# Patient Record
Sex: FEMALE | Race: WHITE | NOT HISPANIC OR LATINO | Employment: FULL TIME | URBAN - METROPOLITAN AREA
[De-identification: names, ages, dates, MRNs, and addresses within clinical notes are randomized per-mention and may not be internally consistent; named-entity substitution may affect disease eponyms.]

---

## 2017-05-18 ENCOUNTER — CONVERSION ENCOUNTER (OUTPATIENT)
Dept: MAMMOGRAPHY | Facility: HOSPITAL | Age: 62
End: 2017-05-18

## 2018-06-18 ENCOUNTER — CONVERSION ENCOUNTER (OUTPATIENT)
Dept: MAMMOGRAPHY | Facility: HOSPITAL | Age: 63
End: 2018-06-18

## 2019-07-29 ENCOUNTER — HOSPITAL ENCOUNTER (OUTPATIENT)
Dept: MAMMOGRAPHY | Facility: HOSPITAL | Age: 64
Discharge: HOME | End: 2019-07-29
Attending: FAMILY MEDICINE

## 2019-08-15 ENCOUNTER — HOSPITAL ENCOUNTER (OUTPATIENT)
Dept: MAMMOGRAPHY | Facility: HOSPITAL | Age: 64
Discharge: HOME | End: 2019-08-15
Attending: FAMILY MEDICINE

## 2020-01-27 ENCOUNTER — HOSPITAL ENCOUNTER (OUTPATIENT)
Dept: CARDIOLOGY | Facility: HOSPITAL | Age: 65
Discharge: HOME | End: 2020-01-27
Attending: FAMILY MEDICINE

## 2020-01-27 ENCOUNTER — HOSPITAL ENCOUNTER (OUTPATIENT)
Dept: OTHER | Facility: HOSPITAL | Age: 65
Discharge: HOME | End: 2020-01-27
Attending: FAMILY MEDICINE

## 2020-02-01 LAB
METANEPH FREE SERPL-SCNC: <10 PG/ML (ref 0–62)
NORMETANEPHRINE, PL: 45 PG/ML (ref 0–145)

## 2020-02-03 LAB
ALDOST SERPL-MCNC: 1.1 NG/DL (ref 0–30)
ALDOST/RENIN PLAS-RTO: 2.7 {RATIO} (ref 0–30)
RENIN PLAS-CCNC: 0.41 NG/ML/HR (ref 0.17–5.38)

## 2020-08-10 ENCOUNTER — HOSPITAL ENCOUNTER (OUTPATIENT)
Dept: MAMMOGRAPHY | Facility: HOSPITAL | Age: 65
Discharge: HOME | End: 2020-08-10
Attending: FAMILY MEDICINE

## 2020-08-27 ENCOUNTER — HOSPITAL ENCOUNTER (OUTPATIENT)
Dept: GENERAL RADIOLOGY | Facility: HOSPITAL | Age: 65
Discharge: HOME | End: 2020-08-27
Attending: INTERNAL MEDICINE

## 2020-08-27 LAB
ALBUMIN SERPL-MCNC: 4.4 G/DL (ref 3.5–5)
ALBUMIN/GLOB SERPL: 1.5 {RATIO} (ref 1.4–2.6)
ALP SERPL-CCNC: 131 U/L (ref 43–160)
ALT SERPL-CCNC: 21 U/L (ref 10–40)
ANION GAP SERPL CALC-SCNC: 18 MMOL/L (ref 8–19)
AST SERPL-CCNC: 20 U/L (ref 15–50)
BILIRUB SERPL-MCNC: 0.28 MG/DL (ref 0.2–1.3)
BUN SERPL-MCNC: 15 MG/DL (ref 5–25)
BUN/CREAT SERPL: 16 {RATIO} (ref 6–20)
CALCIUM SERPL-MCNC: 9.7 MG/DL (ref 8.7–10.4)
CHLORIDE SERPL-SCNC: 103 MMOL/L (ref 99–111)
CHOLEST SERPL-MCNC: 183 MG/DL (ref 107–200)
CHOLEST/HDLC SERPL: 2.5 {RATIO} (ref 3–6)
CONV CO2: 21 MMOL/L (ref 22–32)
CONV TOTAL PROTEIN: 7.4 G/DL (ref 6.3–8.2)
CREAT UR-MCNC: 0.95 MG/DL (ref 0.5–0.9)
GFR SERPLBLD BASED ON 1.73 SQ M-ARVRAT: >60 ML/MIN/{1.73_M2}
GLOBULIN UR ELPH-MCNC: 3 G/DL (ref 2–3.5)
GLUCOSE SERPL-MCNC: 101 MG/DL (ref 65–99)
HDLC SERPL-MCNC: 72 MG/DL (ref 40–60)
LDLC SERPL CALC-MCNC: 91 MG/DL (ref 70–100)
OSMOLALITY SERPL CALC.SUM OF ELEC: 287 MOSM/KG (ref 273–304)
POTASSIUM SERPL-SCNC: 3.9 MMOL/L (ref 3.5–5.3)
SODIUM SERPL-SCNC: 138 MMOL/L (ref 135–147)
TRIGL SERPL-MCNC: 99 MG/DL (ref 40–150)
VLDLC SERPL-MCNC: 20 MG/DL (ref 5–37)

## 2021-03-26 ENCOUNTER — HOSPITAL ENCOUNTER (OUTPATIENT)
Dept: GENERAL RADIOLOGY | Facility: HOSPITAL | Age: 66
Discharge: HOME | End: 2021-03-26
Attending: INTERNAL MEDICINE

## 2021-03-26 LAB
ALBUMIN SERPL-MCNC: 4.3 G/DL (ref 3.5–5)
ALBUMIN/GLOB SERPL: 1.2 {RATIO} (ref 1.4–2.6)
ALP SERPL-CCNC: 115 U/L (ref 43–160)
ALT SERPL-CCNC: 68 U/L (ref 10–40)
ANION GAP SERPL CALC-SCNC: 18 MMOL/L (ref 8–19)
AST SERPL-CCNC: 45 U/L (ref 15–50)
BILIRUB SERPL-MCNC: 0.34 MG/DL (ref 0.2–1.3)
BUN SERPL-MCNC: 17 MG/DL (ref 5–25)
BUN/CREAT SERPL: 18 {RATIO} (ref 6–20)
CALCIUM SERPL-MCNC: 9.5 MG/DL (ref 8.7–10.4)
CHLORIDE SERPL-SCNC: 97 MMOL/L (ref 99–111)
CHOLEST SERPL-MCNC: 157 MG/DL (ref 107–200)
CHOLEST/HDLC SERPL: 2.7 {RATIO} (ref 3–6)
CONV CO2: 24 MMOL/L (ref 22–32)
CONV TOTAL PROTEIN: 7.8 G/DL (ref 6.3–8.2)
CREAT UR-MCNC: 0.97 MG/DL (ref 0.5–0.9)
GFR SERPLBLD BASED ON 1.73 SQ M-ARVRAT: >60 ML/MIN/{1.73_M2}
GLOBULIN UR ELPH-MCNC: 3.5 G/DL (ref 2–3.5)
GLUCOSE SERPL-MCNC: 106 MG/DL (ref 65–99)
HDLC SERPL-MCNC: 59 MG/DL (ref 40–60)
LDLC SERPL CALC-MCNC: 70 MG/DL (ref 70–100)
OSMOLALITY SERPL CALC.SUM OF ELEC: 280 MOSM/KG (ref 273–304)
POTASSIUM SERPL-SCNC: 4.6 MMOL/L (ref 3.5–5.3)
SODIUM SERPL-SCNC: 134 MMOL/L (ref 135–147)
TRIGL SERPL-MCNC: 138 MG/DL (ref 40–150)
VLDLC SERPL-MCNC: 28 MG/DL (ref 5–37)

## 2021-05-22 ENCOUNTER — TRANSCRIBE ORDERS (OUTPATIENT)
Dept: ADMINISTRATIVE | Facility: HOSPITAL | Age: 66
End: 2021-05-22

## 2021-05-22 DIAGNOSIS — Z12.31 SCREENING MAMMOGRAM FOR HIGH-RISK PATIENT: Primary | ICD-10-CM

## 2021-08-11 ENCOUNTER — HOSPITAL ENCOUNTER (OUTPATIENT)
Dept: MAMMOGRAPHY | Facility: HOSPITAL | Age: 66
Discharge: HOME | End: 2021-08-11
Admitting: FAMILY MEDICINE

## 2021-08-11 PROCEDURE — 77067 SCR MAMMO BI INCL CAD: CPT | Performed by: RADIOLOGY

## 2021-08-11 PROCEDURE — 77067 SCR MAMMO BI INCL CAD: CPT

## 2021-08-11 PROCEDURE — 77063 BREAST TOMOSYNTHESIS BI: CPT

## 2021-08-11 PROCEDURE — 77063 BREAST TOMOSYNTHESIS BI: CPT | Performed by: RADIOLOGY

## 2021-08-13 ENCOUNTER — LAB (OUTPATIENT)
Dept: LAB | Facility: HOSPITAL | Age: 66
End: 2021-08-13

## 2021-08-13 ENCOUNTER — TRANSCRIBE ORDERS (OUTPATIENT)
Dept: LAB | Facility: HOSPITAL | Age: 66
End: 2021-08-13

## 2021-08-13 DIAGNOSIS — R07.9 CHEST PAIN, UNSPECIFIED TYPE: Primary | ICD-10-CM

## 2021-08-13 DIAGNOSIS — R07.9 CHEST PAIN, UNSPECIFIED TYPE: ICD-10-CM

## 2021-08-13 LAB
ALBUMIN SERPL-MCNC: 4.5 G/DL (ref 3.5–5.2)
ALBUMIN/GLOB SERPL: 1.8 G/DL
ALP SERPL-CCNC: 106 U/L (ref 39–117)
ALT SERPL W P-5'-P-CCNC: 20 U/L (ref 1–33)
ANION GAP SERPL CALCULATED.3IONS-SCNC: 13 MMOL/L (ref 5–15)
AST SERPL-CCNC: 17 U/L (ref 1–32)
BILIRUB SERPL-MCNC: 0.3 MG/DL (ref 0–1.2)
BUN SERPL-MCNC: 10 MG/DL (ref 8–23)
BUN/CREAT SERPL: 10.4 (ref 7–25)
CALCIUM SPEC-SCNC: 9.4 MG/DL (ref 8.6–10.5)
CHLORIDE SERPL-SCNC: 99 MMOL/L (ref 98–107)
CHOLEST SERPL-MCNC: 152 MG/DL (ref 0–200)
CO2 SERPL-SCNC: 25 MMOL/L (ref 22–29)
CREAT SERPL-MCNC: 0.96 MG/DL (ref 0.57–1)
GFR SERPL CREATININE-BSD FRML MDRD: 58 ML/MIN/1.73
GLOBULIN UR ELPH-MCNC: 2.5 GM/DL
GLUCOSE SERPL-MCNC: 102 MG/DL (ref 65–99)
HDLC SERPL-MCNC: 68 MG/DL (ref 40–60)
LDLC SERPL CALC-MCNC: 65 MG/DL (ref 0–100)
LDLC/HDLC SERPL: 0.92 {RATIO}
POTASSIUM SERPL-SCNC: 4 MMOL/L (ref 3.5–5.2)
PROT SERPL-MCNC: 7 G/DL (ref 6–8.5)
SODIUM SERPL-SCNC: 137 MMOL/L (ref 136–145)
TRIGL SERPL-MCNC: 107 MG/DL (ref 0–150)
VLDLC SERPL-MCNC: 19 MG/DL (ref 5–40)

## 2021-08-13 PROCEDURE — 80053 COMPREHEN METABOLIC PANEL: CPT

## 2021-08-13 PROCEDURE — 80061 LIPID PANEL: CPT

## 2021-08-13 PROCEDURE — 36415 COLL VENOUS BLD VENIPUNCTURE: CPT

## 2021-08-17 ENCOUNTER — TRANSCRIBE ORDERS (OUTPATIENT)
Dept: ADMINISTRATIVE | Facility: HOSPITAL | Age: 66
End: 2021-08-17

## 2021-08-17 DIAGNOSIS — R92.8 ABNORMAL MAMMOGRAM: Primary | ICD-10-CM

## 2021-09-13 ENCOUNTER — APPOINTMENT (OUTPATIENT)
Dept: ULTRASOUND IMAGING | Facility: HOSPITAL | Age: 66
End: 2021-09-13

## 2021-09-13 ENCOUNTER — APPOINTMENT (OUTPATIENT)
Dept: MAMMOGRAPHY | Facility: HOSPITAL | Age: 66
End: 2021-09-13

## 2021-10-04 ENCOUNTER — HOSPITAL ENCOUNTER (OUTPATIENT)
Dept: ULTRASOUND IMAGING | Facility: HOSPITAL | Age: 66
Discharge: HOME | End: 2021-10-04

## 2021-10-04 ENCOUNTER — HOSPITAL ENCOUNTER (OUTPATIENT)
Dept: MAMMOGRAPHY | Facility: HOSPITAL | Age: 66
Discharge: HOME | End: 2021-10-04

## 2021-10-04 DIAGNOSIS — R92.8 ABNORMAL MAMMOGRAM: ICD-10-CM

## 2021-10-04 PROCEDURE — 77065 DX MAMMO INCL CAD UNI: CPT

## 2021-10-04 PROCEDURE — G0279 TOMOSYNTHESIS, MAMMO: HCPCS

## 2022-02-24 ENCOUNTER — TRANSCRIBE ORDERS (OUTPATIENT)
Dept: LAB | Facility: HOSPITAL | Age: 67
End: 2022-02-24

## 2022-02-24 ENCOUNTER — LAB (OUTPATIENT)
Dept: LAB | Facility: HOSPITAL | Age: 67
End: 2022-02-24

## 2022-02-24 DIAGNOSIS — R53.83 TIREDNESS: ICD-10-CM

## 2022-02-24 DIAGNOSIS — R07.9 CHEST PAIN, UNSPECIFIED TYPE: ICD-10-CM

## 2022-02-24 LAB
ALBUMIN SERPL-MCNC: 4.9 G/DL (ref 3.5–5.2)
ALBUMIN/GLOB SERPL: 1.7 G/DL
ALP SERPL-CCNC: 124 U/L (ref 39–117)
ALT SERPL W P-5'-P-CCNC: 27 U/L (ref 1–33)
ANION GAP SERPL CALCULATED.3IONS-SCNC: 13.1 MMOL/L (ref 5–15)
AST SERPL-CCNC: 25 U/L (ref 1–32)
BILIRUB SERPL-MCNC: 0.4 MG/DL (ref 0–1.2)
BUN SERPL-MCNC: 11 MG/DL (ref 8–23)
BUN/CREAT SERPL: 14.5 (ref 7–25)
CALCIUM SPEC-SCNC: 9.3 MG/DL (ref 8.6–10.5)
CHLORIDE SERPL-SCNC: 96 MMOL/L (ref 98–107)
CHOLEST SERPL-MCNC: 158 MG/DL (ref 0–200)
CO2 SERPL-SCNC: 26.9 MMOL/L (ref 22–29)
CREAT SERPL-MCNC: 0.76 MG/DL (ref 0.57–1)
GFR SERPL CREATININE-BSD FRML MDRD: 76 ML/MIN/1.73
GLOBULIN UR ELPH-MCNC: 2.9 GM/DL
GLUCOSE SERPL-MCNC: 108 MG/DL (ref 65–99)
HDLC SERPL-MCNC: 57 MG/DL (ref 40–60)
LDLC SERPL CALC-MCNC: 79 MG/DL (ref 0–100)
LDLC/HDLC SERPL: 1.34 {RATIO}
POTASSIUM SERPL-SCNC: 3.5 MMOL/L (ref 3.5–5.2)
PROT SERPL-MCNC: 7.8 G/DL (ref 6–8.5)
SODIUM SERPL-SCNC: 136 MMOL/L (ref 136–145)
T4 FREE SERPL-MCNC: 1.08 NG/DL (ref 0.93–1.7)
TRIGL SERPL-MCNC: 122 MG/DL (ref 0–150)
TSH SERPL DL<=0.05 MIU/L-ACNC: 2.27 UIU/ML (ref 0.27–4.2)
VLDLC SERPL-MCNC: 22 MG/DL (ref 5–40)

## 2022-02-24 PROCEDURE — 84443 ASSAY THYROID STIM HORMONE: CPT

## 2022-02-24 PROCEDURE — 80053 COMPREHEN METABOLIC PANEL: CPT

## 2022-02-24 PROCEDURE — 36415 COLL VENOUS BLD VENIPUNCTURE: CPT

## 2022-02-24 PROCEDURE — 84439 ASSAY OF FREE THYROXINE: CPT

## 2022-02-24 PROCEDURE — 80061 LIPID PANEL: CPT

## 2022-04-01 ENCOUNTER — TRANSCRIBE ORDERS (OUTPATIENT)
Dept: ADMINISTRATIVE | Facility: HOSPITAL | Age: 67
End: 2022-04-01

## 2022-04-01 DIAGNOSIS — Z12.31 SCREENING MAMMOGRAM, ENCOUNTER FOR: Primary | ICD-10-CM

## 2022-04-01 DIAGNOSIS — M85.80 OSTEOPENIA AFTER MENOPAUSE: ICD-10-CM

## 2022-04-01 DIAGNOSIS — Z78.0 OSTEOPENIA AFTER MENOPAUSE: ICD-10-CM

## 2022-04-01 DIAGNOSIS — M81.0 SENILE OSTEOPOROSIS: ICD-10-CM

## 2022-08-15 ENCOUNTER — HOSPITAL ENCOUNTER (OUTPATIENT)
Dept: MAMMOGRAPHY | Facility: HOSPITAL | Age: 67
Discharge: HOME | End: 2022-08-15

## 2022-08-15 ENCOUNTER — HOSPITAL ENCOUNTER (OUTPATIENT)
Dept: BONE DENSITY | Facility: HOSPITAL | Age: 67
Discharge: HOME | End: 2022-08-15

## 2022-08-15 DIAGNOSIS — M85.80 OSTEOPENIA AFTER MENOPAUSE: ICD-10-CM

## 2022-08-15 DIAGNOSIS — Z12.31 SCREENING MAMMOGRAM, ENCOUNTER FOR: ICD-10-CM

## 2022-08-15 DIAGNOSIS — Z78.0 OSTEOPENIA AFTER MENOPAUSE: ICD-10-CM

## 2022-08-15 DIAGNOSIS — M81.0 SENILE OSTEOPOROSIS: ICD-10-CM

## 2022-08-15 PROCEDURE — 77080 DXA BONE DENSITY AXIAL: CPT

## 2022-08-15 PROCEDURE — 77063 BREAST TOMOSYNTHESIS BI: CPT

## 2022-08-15 PROCEDURE — 77067 SCR MAMMO BI INCL CAD: CPT

## 2022-10-11 ENCOUNTER — LAB (OUTPATIENT)
Dept: LAB | Facility: HOSPITAL | Age: 67
End: 2022-10-11

## 2022-10-11 ENCOUNTER — TRANSCRIBE ORDERS (OUTPATIENT)
Dept: ADMINISTRATIVE | Facility: HOSPITAL | Age: 67
End: 2022-10-11

## 2022-10-11 DIAGNOSIS — R07.9 CHEST PAIN, UNSPECIFIED TYPE: ICD-10-CM

## 2022-10-11 DIAGNOSIS — R07.9 CHEST PAIN, UNSPECIFIED TYPE: Primary | ICD-10-CM

## 2022-10-11 LAB
ALBUMIN SERPL-MCNC: 4.7 G/DL (ref 3.5–5.2)
ALBUMIN/GLOB SERPL: 1.7 G/DL
ALP SERPL-CCNC: 117 U/L (ref 39–117)
ALT SERPL W P-5'-P-CCNC: 18 U/L (ref 1–33)
ANION GAP SERPL CALCULATED.3IONS-SCNC: 11 MMOL/L (ref 5–15)
AST SERPL-CCNC: 17 U/L (ref 1–32)
BILIRUB SERPL-MCNC: 0.3 MG/DL (ref 0–1.2)
BUN SERPL-MCNC: 12 MG/DL (ref 8–23)
BUN/CREAT SERPL: 13.5 (ref 7–25)
CALCIUM SPEC-SCNC: 9.7 MG/DL (ref 8.6–10.5)
CHLORIDE SERPL-SCNC: 96 MMOL/L (ref 98–107)
CHOLEST SERPL-MCNC: 170 MG/DL (ref 0–200)
CO2 SERPL-SCNC: 29 MMOL/L (ref 22–29)
CREAT SERPL-MCNC: 0.89 MG/DL (ref 0.57–1)
EGFRCR SERPLBLD CKD-EPI 2021: 71.6 ML/MIN/1.73
GLOBULIN UR ELPH-MCNC: 2.8 GM/DL
GLUCOSE SERPL-MCNC: 110 MG/DL (ref 65–99)
HDLC SERPL-MCNC: 60 MG/DL (ref 40–60)
LDLC SERPL CALC-MCNC: 89 MG/DL (ref 0–100)
LDLC/HDLC SERPL: 1.43 {RATIO}
POTASSIUM SERPL-SCNC: 4.5 MMOL/L (ref 3.5–5.2)
PROT SERPL-MCNC: 7.5 G/DL (ref 6–8.5)
SODIUM SERPL-SCNC: 136 MMOL/L (ref 136–145)
TRIGL SERPL-MCNC: 121 MG/DL (ref 0–150)
VLDLC SERPL-MCNC: 21 MG/DL (ref 5–40)

## 2022-10-11 PROCEDURE — 36415 COLL VENOUS BLD VENIPUNCTURE: CPT

## 2022-10-11 PROCEDURE — 80053 COMPREHEN METABOLIC PANEL: CPT

## 2022-10-11 PROCEDURE — 80061 LIPID PANEL: CPT

## 2022-10-14 ENCOUNTER — TRANSCRIBE ORDERS (OUTPATIENT)
Dept: ADMINISTRATIVE | Facility: HOSPITAL | Age: 67
End: 2022-10-14

## 2022-10-14 DIAGNOSIS — Z12.31 SCREENING MAMMOGRAM FOR BREAST CANCER: Primary | ICD-10-CM

## 2022-11-10 ENCOUNTER — HOSPITAL ENCOUNTER (EMERGENCY)
Facility: HOSPITAL | Age: 67
Discharge: HOME | End: 2022-11-10
Attending: EMERGENCY MEDICINE | Admitting: EMERGENCY MEDICINE

## 2022-11-10 ENCOUNTER — APPOINTMENT (OUTPATIENT)
Dept: GENERAL RADIOLOGY | Facility: HOSPITAL | Age: 67
End: 2022-11-10

## 2022-11-10 VITALS
OXYGEN SATURATION: 94 % | BODY MASS INDEX: 37 KG/M2 | DIASTOLIC BLOOD PRESSURE: 65 MMHG | HEART RATE: 64 BPM | RESPIRATION RATE: 18 BRPM | SYSTOLIC BLOOD PRESSURE: 101 MMHG | TEMPERATURE: 98 F | HEIGHT: 64 IN | WEIGHT: 216.71 LBS

## 2022-11-10 DIAGNOSIS — M79.602 PAIN OF LEFT UPPER EXTREMITY: Primary | ICD-10-CM

## 2022-11-10 LAB
ALBUMIN SERPL-MCNC: 4.8 G/DL (ref 3.5–5.2)
ALBUMIN/GLOB SERPL: 1.5 G/DL
ALP SERPL-CCNC: 119 U/L (ref 39–117)
ALT SERPL W P-5'-P-CCNC: 20 U/L (ref 1–33)
ANION GAP SERPL CALCULATED.3IONS-SCNC: 13.7 MMOL/L (ref 5–15)
AST SERPL-CCNC: 16 U/L (ref 1–32)
BASOPHILS # BLD AUTO: 0.02 10*3/MM3 (ref 0–0.2)
BASOPHILS NFR BLD AUTO: 0.3 % (ref 0–1.5)
BILIRUB SERPL-MCNC: 0.4 MG/DL (ref 0–1.2)
BUN SERPL-MCNC: 18 MG/DL (ref 8–23)
BUN/CREAT SERPL: 17 (ref 7–25)
CALCIUM SPEC-SCNC: 9.3 MG/DL (ref 8.6–10.5)
CHLORIDE SERPL-SCNC: 101 MMOL/L (ref 98–107)
CO2 SERPL-SCNC: 23.3 MMOL/L (ref 22–29)
CREAT SERPL-MCNC: 1.06 MG/DL (ref 0.57–1)
DEPRECATED RDW RBC AUTO: 43.3 FL (ref 37–54)
EGFRCR SERPLBLD CKD-EPI 2021: 58.1 ML/MIN/1.73
EOSINOPHIL # BLD AUTO: 0.13 10*3/MM3 (ref 0–0.4)
EOSINOPHIL NFR BLD AUTO: 1.7 % (ref 0.3–6.2)
ERYTHROCYTE [DISTWIDTH] IN BLOOD BY AUTOMATED COUNT: 12.6 % (ref 12.3–15.4)
GLOBULIN UR ELPH-MCNC: 3.2 GM/DL
GLUCOSE SERPL-MCNC: 115 MG/DL (ref 65–99)
HCT VFR BLD AUTO: 41.2 % (ref 34–46.6)
HGB BLD-MCNC: 14.3 G/DL (ref 12–15.9)
HOLD SPECIMEN: NORMAL
IMM GRANULOCYTES # BLD AUTO: 0.03 10*3/MM3 (ref 0–0.05)
IMM GRANULOCYTES NFR BLD AUTO: 0.4 % (ref 0–0.5)
LIPASE SERPL-CCNC: 37 U/L (ref 13–60)
LYMPHOCYTES # BLD AUTO: 1.52 10*3/MM3 (ref 0.7–3.1)
LYMPHOCYTES NFR BLD AUTO: 20.1 % (ref 19.6–45.3)
MAGNESIUM SERPL-MCNC: 2.1 MG/DL (ref 1.6–2.4)
MCH RBC QN AUTO: 32.4 PG (ref 26.6–33)
MCHC RBC AUTO-ENTMCNC: 34.7 G/DL (ref 31.5–35.7)
MCV RBC AUTO: 93.2 FL (ref 79–97)
MONOCYTES # BLD AUTO: 0.48 10*3/MM3 (ref 0.1–0.9)
MONOCYTES NFR BLD AUTO: 6.3 % (ref 5–12)
NEUTROPHILS NFR BLD AUTO: 5.38 10*3/MM3 (ref 1.7–7)
NEUTROPHILS NFR BLD AUTO: 71.2 % (ref 42.7–76)
NRBC BLD AUTO-RTO: 0 /100 WBC (ref 0–0.2)
NT-PROBNP SERPL-MCNC: <36 PG/ML (ref 0–900)
PLATELET # BLD AUTO: 365 10*3/MM3 (ref 140–450)
PMV BLD AUTO: 9.4 FL (ref 6–12)
POTASSIUM SERPL-SCNC: 3.5 MMOL/L (ref 3.5–5.2)
PROT SERPL-MCNC: 8 G/DL (ref 6–8.5)
QT INTERVAL: 426 MS
RBC # BLD AUTO: 4.42 10*6/MM3 (ref 3.77–5.28)
SODIUM SERPL-SCNC: 138 MMOL/L (ref 136–145)
TROPONIN I SERPL-MCNC: 0 NG/ML (ref 0–0.08)
TROPONIN I SERPL-MCNC: 0 NG/ML (ref 0–0.08)
WBC NRBC COR # BLD: 7.56 10*3/MM3 (ref 3.4–10.8)
WHOLE BLOOD HOLD COAG: NORMAL
WHOLE BLOOD HOLD SPECIMEN: NORMAL

## 2022-11-10 PROCEDURE — 84484 ASSAY OF TROPONIN QUANT: CPT

## 2022-11-10 PROCEDURE — 83880 ASSAY OF NATRIURETIC PEPTIDE: CPT | Performed by: EMERGENCY MEDICINE

## 2022-11-10 PROCEDURE — 99284 EMERGENCY DEPT VISIT MOD MDM: CPT

## 2022-11-10 PROCEDURE — 71045 X-RAY EXAM CHEST 1 VIEW: CPT

## 2022-11-10 PROCEDURE — 83735 ASSAY OF MAGNESIUM: CPT | Performed by: EMERGENCY MEDICINE

## 2022-11-10 PROCEDURE — 85025 COMPLETE CBC W/AUTO DIFF WBC: CPT

## 2022-11-10 PROCEDURE — 83690 ASSAY OF LIPASE: CPT | Performed by: EMERGENCY MEDICINE

## 2022-11-10 PROCEDURE — 93005 ELECTROCARDIOGRAM TRACING: CPT | Performed by: EMERGENCY MEDICINE

## 2022-11-10 PROCEDURE — 93005 ELECTROCARDIOGRAM TRACING: CPT

## 2022-11-10 PROCEDURE — 80053 COMPREHEN METABOLIC PANEL: CPT | Performed by: EMERGENCY MEDICINE

## 2022-11-10 PROCEDURE — 36415 COLL VENOUS BLD VENIPUNCTURE: CPT

## 2022-11-10 RX ORDER — SODIUM CHLORIDE 0.9 % (FLUSH) 0.9 %
10 SYRINGE (ML) INJECTION AS NEEDED
Status: DISCONTINUED | OUTPATIENT
Start: 2022-11-10 | End: 2022-11-10 | Stop reason: HOSPADM

## 2022-11-10 RX ORDER — ASPIRIN 81 MG/1
324 TABLET, CHEWABLE ORAL ONCE
Status: COMPLETED | OUTPATIENT
Start: 2022-11-10 | End: 2022-11-10

## 2022-11-10 RX ADMIN — ASPIRIN 81 MG 324 MG: 81 TABLET ORAL at 13:09

## 2022-11-10 NOTE — ED PROVIDER NOTES
Time: 12:15 PM EST    Chief Complaint: left arm pain  History provided by: pt  History is limited by: N/A     History of Present Illness:  Patient is a 67 y.o. year old female who presents to the emergency department with left arm pain. Pt states that she was at work when she had an achy and intermittent left arm pain that lasted for two hours. Pt states that she called 's office and he referred her to the ED. Pt denies chest pain, neck pain, back pain, abdominal pain, SOB,nausea, vomiting, lightheadedness, and headache. She reports that she is feeling better now.   Pt states that she had MI 2.5 years ago and had one stent placed.      History provided by:  Patient   used: No        Similar Symptoms Previously: no  Recently seen: no      Patient Care Team  Primary Care Provider: Camila Esquivel DO    Past Medical History:     No Known Allergies  No past medical history on file.  No past surgical history on file.  No family history on file.    Home Medications:  Prior to Admission medications    Not on File        Social History:          Review of Systems:  Review of Systems   Constitutional: Negative for activity change, chills, fatigue and unexpected weight change.   HENT: Negative for congestion, sinus pressure, sore throat and trouble swallowing.    Eyes: Negative for pain, discharge, redness and visual disturbance.   Respiratory: Negative for cough, chest tightness, shortness of breath and wheezing.    Cardiovascular: Negative for chest pain and palpitations.   Gastrointestinal: Negative for abdominal pain, diarrhea, nausea and vomiting.   Endocrine: Negative for cold intolerance and polydipsia.   Genitourinary: Negative for dysuria, frequency, hematuria and urgency.   Musculoskeletal: Negative for arthralgias, joint swelling, neck pain and neck stiffness.   Skin: Negative for color change and rash.   Allergic/Immunologic: Negative for environmental allergies and  "immunocompromised state.   Neurological: Negative for dizziness, weakness and light-headedness.   Hematological: Does not bruise/bleed easily.   Psychiatric/Behavioral: Negative for agitation, confusion, dysphoric mood and suicidal ideas.        Physical Exam:  /65   Pulse 64   Temp 98 °F (36.7 °C)   Resp 18   Ht 162.6 cm (64\")   Wt 98.3 kg (216 lb 11.4 oz)   SpO2 94%   BMI 37.20 kg/m²     Physical Exam  Vitals and nursing note reviewed.   Constitutional:       General: She is not in acute distress.     Appearance: Normal appearance. She is not toxic-appearing.   HENT:      Head: Normocephalic and atraumatic.      Jaw: There is normal jaw occlusion.   Eyes:      General: Lids are normal.      Extraocular Movements: Extraocular movements intact.      Conjunctiva/sclera: Conjunctivae normal.      Pupils: Pupils are equal, round, and reactive to light.   Cardiovascular:      Rate and Rhythm: Normal rate and regular rhythm.      Pulses: Normal pulses.      Heart sounds: Normal heart sounds.   Pulmonary:      Effort: Pulmonary effort is normal. No respiratory distress.      Breath sounds: Normal breath sounds. No wheezing or rhonchi.   Abdominal:      General: Abdomen is flat.      Palpations: Abdomen is soft.      Tenderness: There is no abdominal tenderness. There is no guarding or rebound.   Musculoskeletal:         General: Normal range of motion.      Cervical back: Normal range of motion and neck supple.      Right lower leg: No edema.      Left lower leg: No edema.   Skin:     General: Skin is warm and dry.   Neurological:      Mental Status: She is alert and oriented to person, place, and time. Mental status is at baseline.   Psychiatric:         Mood and Affect: Mood normal.                Medications in the Emergency Department:  Medications   aspirin chewable tablet 324 mg (324 mg Oral Given 11/10/22 1309)        Labs    Labs Reviewed   COMPREHENSIVE METABOLIC PANEL - Abnormal; Notable for the " following components:       Result Value    Glucose 115 (*)     Creatinine 1.06 (*)     Alkaline Phosphatase 119 (*)     eGFR 58.1 (*)     All other components within normal limits    Narrative:     GFR Normal >60  Chronic Kidney Disease <60  Kidney Failure <15     LIPASE - Normal   BNP (IN-HOUSE) - Normal    Narrative:     Among patients with dyspnea, NT-proBNP is highly sensitive for the detection of acute congestive heart failure. In addition NT-proBNP of <300 pg/ml effectively rules out acute congestive heart failure with 99% negative predictive value.     MAGNESIUM - Normal   CBC WITH AUTO DIFFERENTIAL - Normal   POCT TROPONIN I - Normal   POCT TROPONIN I - Normal   RAINBOW DRAW    Narrative:     The following orders were created for panel order [place] Draw.  Procedure                               Abnormality         Status                     ---------                               -----------         ------                     Green Top (Gel)[[id number]]                                  Final result               Lavender Top[[id number]]                                     Final result               Gold Top - SST[[id number]]                                   Final result               Light Blue Top[[id number]]                                   Final result                 Please view results for these tests on the individual orders.   POCT TROPONIN I   POCT TROPONIN-I WITH HOLD TUBE    Narrative:     The following orders were created for panel order POC Troponin I with Hold Tube.  Procedure                               Abnormality         Status                     ---------                               -----------         ------                     POC Troponin I[[id number]]                                                              HOLD Troponin-I Tube[[id number]]                             Final result                 Please view results for these tests on the individual orders.   CBC AND DIFFERENTIAL     Narrative:     The following orders were created for panel order CBC & Differential.  Procedure                               Abnormality         Status                     ---------                               -----------         ------                     CBC Auto Differential[[id number]]        Normal              Final result                 Please view results for these tests on the individual orders.   GREEN TOP   LAVENDER TOP   GOLD TOP - SST   LIGHT BLUE TOP   HOLD ISTAT TROPONIN-I TUBE       Lab Results (last 24 hours)     ** No results found for the last 24 hours. **           Imaging:    XR Chest 1 View    Result Date: 11/10/2022  Narrative: PROCEDURE: XR CHEST 1 VW  COMPARISON: [facility], CR, CHEST AP/PA 1 VIEW, 4/21/2021, 12:01.  INDICATIONS: CHEST PAIN TODAY  FINDINGS:  The heart size is normal.  The pulmonary vascular markings are normal.  The lungs and pleural spaces are clear of active disease.  There are mild chronic age-related changes involving the bony thorax and thoracic aorta.      Impression:  No active disease.       RONALDO CONTRERAS MD       Electronically Signed and Approved By: RONALDO CONTRERAS MD on 11/10/2022 at 12:47               No Radiology Exams Resulted Within Past 24 Hours    Procedures:  ECG 12 Lead ED Triage Standing Order; Chest Pain      Date/Time: 11/10/2022 1:39 PM  Performed by: Declan Hogan DO  Authorized by: Declan Hogan DO   Rhythm: sinus rhythm  BPM: 63  Clinical impression comment: normal P-wave, normal NH-interval, normal QRS interval with normal axis, normal ST segment, normal QT-Qtc  Comments:             Progress                            Medical Decision Making:  [facility]     Final diagnoses:   Pain of left upper extremity        Disposition:  ED Disposition     ED Disposition   Discharge    Condition   Stable    Comment   --             This medical record created using voice recognition software.        Documentation assistance provided by Salma  Ankur acting as scribe for No att. providers found. Information recorded by the scribe was done at my direction and has been verified and validated by me.          Salma Pascual  11/10/22 1311       Salma Pascual  11/10/22 1401       Declan Hogan DO  11/11/22 1500

## 2022-11-10 NOTE — DISCHARGE INSTRUCTIONS
Take Tylenol as needed for pain.  Apply moist heat to affected areas 20 minutes at a time 4 times daily.  Return for worsening symptoms.  Follow-up your doctor by calling for an appointment.

## 2022-11-11 LAB — QT INTERVAL: 453 MS

## 2023-01-10 ENCOUNTER — HOSPITAL ENCOUNTER (OUTPATIENT)
Dept: MAMMOGRAPHY | Facility: HOSPITAL | Age: 68
Discharge: HOME | End: 2023-01-10
Payer: MEDICARE

## 2023-01-10 DIAGNOSIS — Z12.31 SCREENING MAMMOGRAM FOR BREAST CANCER: ICD-10-CM

## 2023-03-24 ENCOUNTER — TELEPHONE (OUTPATIENT)
Dept: ORTHOPEDIC SURGERY | Facility: CLINIC | Age: 68
End: 2023-03-24
Payer: MEDICARE

## 2023-03-24 ENCOUNTER — HOSPITAL ENCOUNTER (EMERGENCY)
Facility: HOSPITAL | Age: 68
Discharge: HOME | End: 2023-03-24
Attending: EMERGENCY MEDICINE | Admitting: EMERGENCY MEDICINE
Payer: MEDICARE

## 2023-03-24 VITALS
HEART RATE: 112 BPM | TEMPERATURE: 99 F | OXYGEN SATURATION: 96 % | DIASTOLIC BLOOD PRESSURE: 90 MMHG | SYSTOLIC BLOOD PRESSURE: 154 MMHG | RESPIRATION RATE: 20 BRPM

## 2023-03-24 DIAGNOSIS — S72.25XA CLOSED NONDISPLACED SUBTROCHANTERIC FRACTURE OF LEFT FEMUR, INITIAL ENCOUNTER: Primary | ICD-10-CM

## 2023-03-24 DIAGNOSIS — R52 PAIN: ICD-10-CM

## 2023-03-24 PROCEDURE — 63710000001 ONDANSETRON ODT 4 MG TABLET DISPERSIBLE: Performed by: NURSE PRACTITIONER

## 2023-03-24 PROCEDURE — 99283 EMERGENCY DEPT VISIT LOW MDM: CPT

## 2023-03-24 RX ORDER — HYDROCODONE BITARTRATE AND ACETAMINOPHEN 5; 325 MG/1; MG/1
1 TABLET ORAL EVERY 6 HOURS PRN
Qty: 12 TABLET | Refills: 0 | Status: SHIPPED | OUTPATIENT
Start: 2023-03-24 | End: 2023-03-27

## 2023-03-24 RX ORDER — HYDROCODONE BITARTRATE AND ACETAMINOPHEN 5; 325 MG/1; MG/1
1 TABLET ORAL ONCE
Status: COMPLETED | OUTPATIENT
Start: 2023-03-24 | End: 2023-03-24

## 2023-03-24 RX ORDER — ONDANSETRON 4 MG/1
4 TABLET, ORALLY DISINTEGRATING ORAL ONCE
Status: COMPLETED | OUTPATIENT
Start: 2023-03-24 | End: 2023-03-24

## 2023-03-24 RX ORDER — ONDANSETRON 4 MG/1
4 TABLET, ORALLY DISINTEGRATING ORAL 4 TIMES DAILY PRN
Qty: 30 TABLET | Refills: 0 | Status: SHIPPED | OUTPATIENT
Start: 2023-03-24

## 2023-03-24 RX ADMIN — ONDANSETRON 4 MG: 4 TABLET, ORALLY DISINTEGRATING ORAL at 15:26

## 2023-03-24 RX ADMIN — HYDROCODONE BITARTRATE AND ACETAMINOPHEN 1 TABLET: 5; 325 TABLET ORAL at 15:26

## 2023-03-24 NOTE — TELEPHONE ENCOUNTER
Caller: PATIENT    Relationship to patient: SELF    Best call back number: [phone]    Chief complaint: Closed nondisplaced supracondylar fracture of distal end of left femur with intracondylar extension, initial encounter ([facility])    Type of visit: NEW PATIENT    Requested date: ASAP- URGENT DIAGNOSIS    Additional notes: PATIENT WAS CALLING TO SCHEDULE A NEW PATIENT APPT FOR DIAGNOSIS, Closed nondisplaced supracondylar fracture of distal end of left femur with intracondylar extension, initial encounter ([facility]). I ATTEMPTED TO WARM TRANSFER CALL TO THE OFFICE DUE TO GRAYSON STATING IN THE REFERRAL COMMUNICATION IT WAS OKAY TO SCHEDULE WITH DR. PALMER ON Monday BUT I HAVE NO AVAILABILITY WITH DR. PALMER UNTIL 04.10.23. PATIENT WOULD LIKE A CALL BACK ASAP TO SCHEDULE AN APPT. THANK YOU!

## 2023-03-24 NOTE — ED PROVIDER NOTES
Time: 3:13 PM EDT  Date of encounter:  3/24/2023  Independent Historian/Clinical History and Information was obtained by:   Patient and Chart  Chief Complaint: leg pain from broken leg    History is limited by: N/A    History of Present Illness:  Patient is a 68 y.o. year old female who presents to the emergency department for evaluation of leg pain for broken leg. She fell on concrete after tripping and broke her leg yesterday. She was seen at local Urgent Care yesterday and was diagnosed with a leg fracture that was indicated on knee x-ray. Patient was discharged with knee immobilizer on her left knee, but no pain medication. Patient was told to take over-the-counter medication for pain including Tylenol and ibuprofen.    Patient is allergic to Advil. Patient was throwing up in the emergency department. Patient was driven by  to the clinic who helped provide some information with her.    [facility]    Patient Care Team  Primary Care Provider: Camila Esquivel DO    Past Medical History:     Allergies   Allergen Reactions   • Advil [Ibuprofen] Hives     Past Medical History:   Diagnosis Date   • Ankle sprain 1972   • Dislocation of finger 2012   • Fracture of wrist 1967   • Fracture, femur (HCC) 03/23/2023   • GERD (gastroesophageal reflux disease)    • Hyperlipidemia    • Hypertension    • Osteoporosis    • Seasonal allergies    • Tear of meniscus of knee 2012     Past Surgical History:   Procedure Laterality Date   • HAND SURGERY     • HYSTERECTOMY N/A    • KNEE SURGERY  2012     Family History   Problem Relation Age of Onset   • Osteoporosis Mother    • Cancer Father         Colon cancer       Home Medications:  Prior to Admission medications    Medication Sig Start Date End Date Taking? Authorizing Provider   alendronate (FOSAMAX) 70 MG tablet  1/10/23   ProviderJuan MD   amLODIPine (NORVASC) 10 MG tablet Take 1 tablet by mouth Daily.    Juan Barrett MD   aspirin 81 MG chewable tablet Chew  1 tablet Daily.    Juan Barrett MD   buPROPion SR (WELLBUTRIN SR) 100 MG 12 hr tablet  22   Juan Barrett MD   cyclobenzaprine (FLEXERIL) 10 MG tablet  23   Juan Barrett MD   fexofenadine-pseudoephedrine (ALLEGRA-D 24) 180-240 MG per 24 hr tablet Take 1 tablet by mouth Daily.    Juan Barrett MD   hydroCHLOROthiazide (HYDRODIURIL) 25 MG tablet  22   Juan Barrett MD   losartan (COZAAR) 100 MG tablet  22   Juan Barrett MD   metoprolol succinate XL (TOPROL-XL) 100 MG 24 hr tablet  1/10/23   Juan Barrett MD   prasugrel (EFFIENT) 10 MG tablet Take 1 tablet by mouth Daily.    Juan Barrett MD   rosuvastatin (CRESTOR) 10 MG tablet  22   Jaun Barrett MD   spironolactone (ALDACTONE) 25 MG tablet  22   Juan Barrett MD   Vitamin D, Cholecalciferol, (CHOLECALCIFEROL) 10 MCG (400 UNIT) tablet Take 1 tablet by mouth Daily.    Juan Barrett MD        Social History:   Social History     Tobacco Use   • Smoking status: Former     Packs/day: 1.00     Years: 15.00     Pack years: 15.00     Types: Cigarettes     Start date: 1998     Quit date: 2016     Years since quittin.9   • Smokeless tobacco: Never   Vaping Use   • Vaping Use: Never used   Substance Use Topics   • Alcohol use: Not Currently     Comment: occ   • Drug use: Never         Review of Systems:  Review of Systems   Constitutional: Negative.    HENT: Negative.    Eyes: Negative.    Respiratory: Negative.    Cardiovascular: Negative.    Gastrointestinal: Negative.    Endocrine: Negative.    Genitourinary: Negative.    Musculoskeletal: Positive for arthralgias (left lower extremity).   Skin: Negative.    Allergic/Immunologic: Negative.    Neurological: Negative.    Hematological: Negative.    Psychiatric/Behavioral: Negative.    All other systems reviewed and are negative.       Physical Exam:  /90 (BP Location: Left arm, Patient  Position: Sitting)   Pulse 112   Temp 99 °F (37.2 °C) (Oral)   Resp 20   SpO2 96%     Physical Exam  Vitals and nursing note reviewed.   Constitutional:       General: She is awake. She is not in acute distress.     Appearance: Normal appearance. She is not ill-appearing or toxic-appearing.   HENT:      Head: Normocephalic and atraumatic.      Right Ear: External ear normal.      Left Ear: External ear normal.      Nose: Nose normal.      Mouth/Throat:      Lips: Pink.      Mouth: Mucous membranes are moist.   Eyes:      General: No scleral icterus.     Conjunctiva/sclera: Conjunctivae normal.   Cardiovascular:      Rate and Rhythm: Tachycardia present.   Pulmonary:      Effort: Pulmonary effort is normal. No respiratory distress.   Musculoskeletal:         General: Swelling, tenderness and signs of injury present.   Skin:     Coloration: Skin is not pale.      Findings: No rash.      Comments: Color and temperature is normal in the left lower extremity. Pulses are palpable.   Neurological:      General: No focal deficit present.      Mental Status: She is alert and oriented to person, place, and time.   Psychiatric:         Speech: Speech normal.         Behavior: Behavior normal. Behavior is cooperative.                  Procedures:  Procedures      Medical Decision Making:      Comorbidities that affect care:    Osteoporosis, history of meniscus tear    External Notes reviewed:    Previous Clinic Note: Urgent care note from 3-      The following orders were placed and all results were independently analyzed by me:  No orders of the defined types were placed in this encounter.      Medications Given in the Emergency Department:  Medications   ondansetron ODT (ZOFRAN-ODT) disintegrating tablet 4 mg (4 mg Oral Given 3/24/23 1526)   HYDROcodone-acetaminophen (NORCO) 5-325 MG per tablet 1 tablet (1 tablet Oral Given 3/24/23 1526)        ED Course:         Labs:    Lab Results (last 24 hours)     ** No  results found for the last 24 hours. **           Imaging:    No Radiology Exams Resulted Within Past 24 Hours      Differential Diagnosis and Discussion:    Extremity Pain: Differential diagnosis includes but is not limited to soft tissue sprain, tendonitis, tendon injury, dislocation, fracture, deep vein thrombosis, arterial insufficiency, osteoarthritis, bursitis, and ligamentous damage.        [facility]         Patient Care Considerations:    CONSULT: I considered consulting Orthopedics, however Patient already has outpatient follow-up scheduled.      Consultants/Shared Management Plan:    None    Social Determinants of Health:    Patient is independent, reliable, and has access to care.       Disposition and Care Coordination:    Discharged: The patient is suitable and stable for discharge with no need for consideration of observation or admission.    I have explained the patient´s condition, diagnoses and treatment plan based on the information available to me at this time. I have answered questions and addressed any concerns. The patient has a good  understanding of the patient´s diagnosis, condition, and treatment plan as can be expected at this point. The vital signs have been stable. The patient´s condition is stable and appropriate for discharge from the emergency department.      The patient will pursue further outpatient evaluation with the primary care physician or other designated or consulting physician as outlined in the discharge instructions. They are agreeable to this plan of care and follow-up instructions have been explained in detail. The patient has received these instructions in written format and have expressed an understanding of the discharge instructions. The patient is aware that any significant change in condition or worsening of symptoms should prompt an immediate return to this or the closest emergency department or call to 911.    Final diagnoses:   Closed nondisplaced subtrochanteric  fracture of left femur, initial encounter (HCC)   Pain        ED Disposition     ED Disposition   Discharge    Condition   Stable    Comment   --           Documentation assistance provided by Hemal Hernandez acting as scribe for Ruma Arauz. Information recorded by the scribe was done at my direction and has been verified and validated by me.       This medical record created using voice recognition software.             Hemal Hernandez  03/24/23 1538       Katerina Chamberlain  03/24/23 1602       Ruma Tan APRN  03/28/23 0032

## 2023-03-27 ENCOUNTER — OFFICE VISIT (OUTPATIENT)
Dept: ORTHOPEDIC SURGERY | Facility: CLINIC | Age: 68
End: 2023-03-27
Payer: MEDICARE

## 2023-03-27 VITALS — WEIGHT: 220 LBS | HEIGHT: 64 IN | HEART RATE: 83 BPM | OXYGEN SATURATION: 95 % | BODY MASS INDEX: 37.56 KG/M2

## 2023-03-27 DIAGNOSIS — S42.402A CLOSED FRACTURE OF DISTAL END OF LEFT HUMERUS, UNSPECIFIED FRACTURE MORPHOLOGY, INITIAL ENCOUNTER: Primary | ICD-10-CM

## 2023-03-27 RX ORDER — HYDROCODONE BITARTRATE AND ACETAMINOPHEN 7.5; 325 MG/1; MG/1
1 TABLET ORAL EVERY 6 HOURS PRN
Qty: 40 TABLET | Refills: 0 | Status: SHIPPED | OUTPATIENT
Start: 2023-03-27

## 2023-03-27 RX ORDER — OMEPRAZOLE 40 MG/1
CAPSULE, DELAYED RELEASE ORAL
COMMUNITY
Start: 2023-02-10

## 2023-03-27 RX ORDER — BACLOFEN 20 MG/1
TABLET ORAL
COMMUNITY
Start: 2023-03-22

## 2023-03-27 NOTE — PROGRESS NOTES
"Chief Complaint  Initial Evaluation and Pain of the Left Knee     Subjective      Radha Bocanegra presents to [facility] for initial evaluation of the left knee. On 3/23/23 She fell on concrete after tripping and broke her leg. She was seen at local Urgent Care on 3/23/23  and was diagnosed with a leg fracture that was indicated on knee x-ray. Patient was discharged with knee immobilizer on her left knee, but no pain medication. Patient was told to take over-the-counter medication for pain including Tylenol and ibuprofen. On 3/24/23 the patient was throwing up in the emergency department. Patient here today still having nausea. She is here today in a left knee immobilizer.      Allergies   Allergen Reactions   • Advil [Ibuprofen] Hives        Social History     Socioeconomic History   • Marital status:    Tobacco Use   • Smoking status: Former     Packs/day: 1.00     Years: 15.00     Pack years: 15.00     Types: Cigarettes     Start date: 1998     Quit date: 2016     Years since quittin.9   • Smokeless tobacco: Never   Vaping Use   • Vaping Use: Never used   Substance and Sexual Activity   • Alcohol use: Not Currently     Comment: occ   • Drug use: Never   • Sexual activity: Yes     Partners: Male     Birth control/protection: Hysterectomy        Review of Systems     Objective   Vital Signs:   Pulse 83   Ht 162.6 cm (64\")   Wt 99.8 kg (220 lb)   SpO2 95%   BMI 37.76 kg/m²       Physical Exam  Constitutional:       Appearance: Normal appearance. Patient is well-developed and normal weight.   HENT:      Head: Normocephalic.      Right Ear: Hearing and external ear normal.      Left Ear: Hearing and external ear normal.      Nose: Nose normal.   Eyes:      Conjunctiva/sclera: Conjunctivae normal.   Cardiovascular:      Rate and Rhythm: Normal rate.   Pulmonary:      Effort: Pulmonary effort is normal.      Breath sounds: No wheezing or rales.   Abdominal:      " Palpations: Abdomen is soft.      Tenderness: There is no abdominal tenderness.   Musculoskeletal:      Cervical back: Normal range of motion.   Skin:     Findings: No rash.   Neurological:      Mental Status: Patient  is alert and oriented to person, place, and time.   Psychiatric:         Mood and Affect: Mood and affect normal.         Judgment: Judgment normal.       Ortho Exam      LEFT KNEE  Positive EHL, FHL, HS and TA. Sensation intact to light touch all 5 nerves of the foot. Ambulates with  Good strength to hamstrings, quadriceps, dorsiflexors, and plantar flexors.  Knee Extensor Mechanism intact. Moderate effusion.         Procedures        Imaging Results (Most Recent)     None           Result Review :       XR Knee 4+ View Left    Result Date: 3/23/2023  Narrative: PROCEDURE: XR KNEE 4+ VW LEFT  COMPARISON: [facility], CR, LT KNEE 2 VIEWS, 9/22/2009, 12:35.  [facility], CR, KNEE 3 VIEWS LT, 9/23/2009, 13:12.  INDICATIONS: Fall on knee <1 hr ago with significant swelling and soft tissue injury.  H/O injury with hardware x 10 yrs ago.  FINDINGS:  BONES: There are postoperative changes from prior patellar fracture repair.  There is a longitudinal nondisplaced fracture of the distal left femoral metadiaphysis extending into the left medial femoral condyle. SOFT TISSUES: Negative.  No visible soft tissue swelling.  EFFUSION: Large suprapatellar effusion. OTHER: Negative.       Impression:  Longitudinal nondisplaced fracture of the distal left femoral metadiaphysis extending into the left medial femoral condyle.  Large suprapatellar effusion.  Old left patellar fracture.      MICHAEL SANDOVAL MD       Electronically Signed and Approved By: MICHAEL SANDOVAL MD on 3/23/2023 at 19:55                      Assessment and Plan     Diagnoses and all orders for this visit:    1. Closed fracture of distal end of left humerus, unspecified fracture morphology, initial encounter  (Primary)        Discussed the treatment plan with the patient. I reviewed the X-rays that were obtained 3/24/23 with the patient. Continue knee immobilizer and  to work on gentle ROM and educated on movement. Work on knee extension. Ice and elevate. Prescribed pain medication.     Call or return if worsening symptoms.    Follow Up     2 weeks X ray.       Patient was given instructions and counseling regarding her condition or for health maintenance advice. Please see specific information pulled into the AVS if appropriate.     Scribed for Timo Gamez MD by Fiorella Dias MA.  03/27/23   07:57 EDT    I have personally performed the services described in this document as scribed by the above individual and it is both accurate and complete. Timo Gamez MD 03/29/23

## 2023-04-10 ENCOUNTER — OFFICE VISIT (OUTPATIENT)
Dept: ORTHOPEDIC SURGERY | Facility: CLINIC | Age: 68
End: 2023-04-10
Payer: MEDICARE

## 2023-04-10 VITALS — WEIGHT: 220 LBS | BODY MASS INDEX: 37.56 KG/M2 | HEIGHT: 64 IN

## 2023-04-10 DIAGNOSIS — S42.402A CLOSED FRACTURE OF DISTAL END OF LEFT HUMERUS, UNSPECIFIED FRACTURE MORPHOLOGY, INITIAL ENCOUNTER: Primary | ICD-10-CM

## 2023-04-10 DIAGNOSIS — S72.415D CLOSED NONDISPLACED FRACTURE OF CONDYLE OF LEFT FEMUR WITH ROUTINE HEALING, SUBSEQUENT ENCOUNTER: ICD-10-CM

## 2023-04-10 PROCEDURE — 1159F MED LIST DOCD IN RCRD: CPT | Performed by: PHYSICIAN ASSISTANT

## 2023-04-10 PROCEDURE — 1160F RVW MEDS BY RX/DR IN RCRD: CPT | Performed by: PHYSICIAN ASSISTANT

## 2023-04-10 PROCEDURE — 99024 POSTOP FOLLOW-UP VISIT: CPT | Performed by: PHYSICIAN ASSISTANT

## 2023-04-10 NOTE — PROGRESS NOTES
"Chief Complaint  Pain and Follow-up of the Left Knee    Subjective      Radha Bocanegra presents to [facility] for follow-up of nondisplaced fracture of the distal left femoral metadiaphysis with extension into the left medial femoral condyle sustained an injury on 3/23/2023.  Patient was seen in urgent care and placed into a knee immobilizer.  She was initially evaluated in clinic on 3/27/2023, recommended to continue knee immobilizer with protected, partial weightbearing and advised on gentle ROM to the knee.  The patient presents today in wheelchair without knee immobilizer in place.  Reports that she discontinued immobilizer at least a week ago.  She has been partial weightbearing with use of crutches.    Objective   Allergies   Allergen Reactions   • Advil [Ibuprofen] Hives       Vital Signs:   Ht 162.6 cm (64\")   Wt 99.8 kg (220 lb)   BMI 37.76 kg/m²       Physical Exam    Constitutional: Awake, alert. Well nourished appearance.    Integumentary: Warm, dry, intact. No obvious rashes.    HENT: Atraumatic, normocephalic.   Respiratory: Non labored respirations .   Cardiovascular: Intact peripheral pulses.    Psychiatric: Normal mood and affect. A&O X3    Ortho Exam  Left knee: Skin is warm, dry, and intact.  No tenderness to palpation of left distal femur.  Full knee extension and flexion to 75 degrees with pain.  Full plantarflexion and dorsiflexion of the ankle.  Sensation intact light touch.  Distal neurovascular intact.  Gait not assessed as patient presents in wheelchair and with weight bearing restrictions    Imaging Results (Most Recent)     Procedure Component Value Units Date/Time    XR Knee 3 View Left [[id number]] Resulted: 04/10/23 1111     Updated: 04/10/23 1113    Narrative:      X-Ray Report:  Study: X-rays ordered, taken in the office, and reviewed today.   Site: Left knee Xray  Indication: Fracture  View: AP/Lateral, Standing and [place] view(s)  Findings: " Well-healing fracture of non-displaced left medial femoral   condyle, appropriate anatomic alignment.  Prior studies available for comparison: yes                       Assessment and Plan   Problem List Items Addressed This Visit    None  Visit Diagnoses     Closed fracture of distal end of left humerus, unspecified fracture morphology, initial encounter    -  Primary    Relevant Orders    XR Knee 3 View Left (Completed)    Closed nondisplaced fracture of condyle of left femur with routine healing, subsequent encounter              Follow Up   Return in about 4 weeks (around 5/8/2023).  Patient is a former smoker.  Encouraged continued tobacco cessation.  Did not discuss options for smoking cessation.    Patient Instructions   X-rays taken and reviewed. Discussed with Dr. Gamez. Can discontinue immobilizer and will provide L hinged knee brace, unlocked 0-40 degrees today. Continue gentle L knee ROM efforts. Continue partial weightbearing up to 50 lbs.     Follow up in 4 weeks. Repeat x-rays needed. Call with changes or concerns.     Patient was given instructions and counseling regarding her condition or for health maintenance advice. Please see specific information pulled into the AVS if appropriate.

## 2023-04-10 NOTE — PATIENT INSTRUCTIONS
X-rays taken and reviewed. Discussed with Dr. Gamez. Can discontinue immobilizer and will provide L hinged knee brace, unlocked 0-40 degrees today. Continue gentle L knee ROM efforts. Continue partial weightbearing up to 50 lbs.     Follow up in 4 weeks. Repeat x-rays needed. Call with changes or concerns.

## 2023-05-08 ENCOUNTER — OFFICE VISIT (OUTPATIENT)
Dept: ORTHOPEDIC SURGERY | Facility: CLINIC | Age: 68
End: 2023-05-08
Payer: MEDICARE

## 2023-05-08 VITALS — HEIGHT: 64 IN | BODY MASS INDEX: 37.56 KG/M2 | WEIGHT: 220 LBS

## 2023-05-08 DIAGNOSIS — S42.402D CLOSED FRACTURE OF DISTAL END OF LEFT HUMERUS WITH ROUTINE HEALING, UNSPECIFIED FRACTURE MORPHOLOGY, SUBSEQUENT ENCOUNTER: Primary | ICD-10-CM

## 2023-05-08 NOTE — PATIENT INSTRUCTIONS
X-rays taken and reviewed. Discussed and reviewed with Dr. Gamez. Can discontinue hinged knee brace. Normal knee brace provided in office. Can advance to [facility]. Referral for PT placed today.     Follow up in 6 weeks. Repeat x-rays needed. Call with changes or concerns.

## 2023-05-08 NOTE — PROGRESS NOTES
"Chief Complaint  Pain and Follow-up of the Left Knee    Subjective      Radha Bocanegra presents to [facility] for follow-up of nondisplaced fracture of the distal left femoral metadiaphysis with extension into the left medial femoral condyle sustained an injury on 3/23/2023.  She was last seen in office on 4/10/2023, placed into hinged knee brace which was unlocked to 0 to 40 degrees and recommended partial weightbearing up to 50 pounds.  She presents today for follow-up without hinged knee brace in place and with use of crutches.  She reports \"pain comes and goes, but feels like it is my muscles or ligaments\".  She reports that she is \"been wearing my hinged knee brace some\".    Objective   Allergies   Allergen Reactions   • Advil [Ibuprofen] Hives       Vital Signs:   Ht 162.6 cm (64\")   Wt 99.8 kg (220 lb)   BMI 37.76 kg/m²       Physical Exam    Constitutional: Awake, alert. Well nourished appearance.    Integumentary: Warm, dry, intact. No obvious rashes.    HENT: Atraumatic, normocephalic.   Respiratory: Non labored respirations .   Cardiovascular: Intact peripheral pulses.    Psychiatric: Normal mood and affect. A&O X3    Ortho Exam  Left knee: Mild tenderness to palpation of distal femur.  Full knee extension and flexion to 120 degrees.  Full plantarflexion and dorsiflexion of the ankle.  Sensation intact light touch.  Distal neurovascular intact.  No tenderness to palpation of joint lines.  No crepitus.    Imaging Results (Most Recent)     Procedure Component Value Units Date/Time    XR Knee 3 View Left [[id number]] Resulted: 05/09/23 1424     Updated: 05/09/23 1425    Narrative:      X-Ray Report:  Study: X-rays ordered, taken in the office, and reviewed today.   Site: Left knee xray  Indication: Fracture  View: AP/Lateral view(s)  Findings: Continued improvement noted to healing, nondisplaced left medial   femoral condyle.  Prior studies available for comparison: yes          "              Assessment and Plan   Problem List Items Addressed This Visit    None  Visit Diagnoses     Closed fracture of distal end of left humerus with routine healing, unspecified fracture morphology, subsequent encounter    -  Primary    Relevant Orders    XR Knee 3 View Left (Completed)    Ambulatory Referral to Physical Therapy (Completed)          Follow Up   Return in about 6 weeks (around 6/19/2023).  Patient is a former smoker.  Encouraged continued tobacco cessation.  Did not discuss options for smoking cessation.    Patient Instructions   X-rays taken and reviewed. Discussed and reviewed with Dr. Gamez. Can discontinue hinged knee brace. Normal knee brace provided in office. Can advance to [facility]. Referral for PT placed today.     Follow up in 6 weeks. Repeat x-rays needed. Call with changes or concerns.     Patient was given instructions and counseling regarding her condition or for health maintenance advice. Please see specific information pulled into the AVS if appropriate.

## 2023-05-09 DIAGNOSIS — S72.415D CLOSED NONDISPLACED FRACTURE OF CONDYLE OF LEFT FEMUR WITH ROUTINE HEALING, SUBSEQUENT ENCOUNTER: Primary | ICD-10-CM

## 2023-05-09 DIAGNOSIS — M25.562 LEFT KNEE PAIN, UNSPECIFIED CHRONICITY: ICD-10-CM

## 2023-06-19 ENCOUNTER — OFFICE VISIT (OUTPATIENT)
Dept: ORTHOPEDIC SURGERY | Facility: CLINIC | Age: 68
End: 2023-06-19
Payer: MEDICARE

## 2023-06-19 VITALS — BODY MASS INDEX: 34.15 KG/M2 | WEIGHT: 200 LBS | HEIGHT: 64 IN

## 2023-06-19 DIAGNOSIS — S72.415D CLOSED NONDISPLACED FRACTURE OF CONDYLE OF LEFT FEMUR WITH ROUTINE HEALING, SUBSEQUENT ENCOUNTER: Primary | ICD-10-CM

## 2023-06-19 NOTE — PATIENT INSTRUCTIONS
X-rays reviewed. Patient is doing very well and feels ready for discharge from PT to [facility]. Continue home exercise program to progress strength and ROM. Continue icing as needed up to 3-4 times daily for no longer than 15 to 20 minutes at a time.    Follow-up as needed. Call with any changes or concerns.

## 2023-06-19 NOTE — PROGRESS NOTES
"Chief Complaint  Pain and Follow-up of the Left Knee    Subjective      Radha Bocanegra presents to [facility] for follow-up of nondisplaced fracture of the distal left femoral diaphysis with extension into the left medial femoral condyle sustained an injury on 3/23/2023.  Patient was treated conservatively/nonoperatively with initial immobilization in hinged knee brace, which was discontinued at her last office appointment on 5/8/2023.  Patient was placed into a normal knee brace and received referral to physical therapy.  Patient presents today independently ambulatory without use of assistive device.  She has been participating in outpatient physical therapy through \[facility]\"" and [facility], reports that this is \"going good\".  She is participating twice weekly and feels she is ready for discharge to home exercise program at this time.  She reports that her left knee is \"where I needed to be\".  She has no further complaints of pain.    Objective   Allergies   Allergen Reactions    Advil [Ibuprofen] Hives       Vital Signs:   Ht 162.6 cm (64\")   Wt 90.7 kg (200 lb)   BMI 34.33 kg/m²       Physical Exam    Constitutional: Awake, alert. Well nourished appearance.    Integumentary: Warm, dry, intact. No obvious rashes.    HENT: Atraumatic, normocephalic.   Respiratory: Non labored respirations .   Cardiovascular: Intact peripheral pulses.    Psychiatric: Normal mood and affect. A&O X3    Ortho Exam  Left knee: Skin is warm, dry, and intact.  No edema.  No tenderness to palpation.  Full knee extension and flexion to 130 degrees.  Patella is well tracking and knee is stable to varus and valgus stress.  Negative Lachman's.  Negative Jamie's.  Full plantarflexion and dorsiflexion of the ankle.  Sensation intact light touch.  Distal neurovascular intact.  Smooth sit to stand transition.  Patient fully weightbearing with nonantalgic gait.    Imaging Results (Most Recent)       Procedure Component " Value Units Date/Time    XR Knee 3 View Left [[id number]] Resulted: 06/19/23 0906     Updated: 06/19/23 0907    Narrative:      X-Ray Report:  Study: X-rays ordered, taken in the office, and reviewed today.   Site: Left knee xray  Indication: Fracture  View: AP/Lateral, Standing, and [place] view(s)  Findings: Continued improvement and interval healing noted to the left   distal femoral condyle fracture, nondisplaced.  Prior studies available for comparison: yes                       Assessment and Plan   Problem List Items Addressed This Visit    None  Visit Diagnoses       Closed nondisplaced fracture of condyle of left femur with routine healing, subsequent encounter    -  Primary    Relevant Orders    XR Knee 3 View Left (Completed)            Follow Up   Return if symptoms worsen or fail to improve.    Tobacco Use: Medium Risk    Smoking Tobacco Use: Former    Smokeless Tobacco Use: Never    Passive Exposure: Not on file     Patient is a former smoker.  Encouraged continued tobacco cessation.  Did not discuss options for smoking cessation.    Patient Instructions   X-rays reviewed. Patient is doing very well and feels ready for discharge from PT to [facility]. Continue home exercise program to progress strength and ROM. Continue icing as needed up to 3-4 times daily for no longer than 15 to 20 minutes at a time.    Follow-up as needed. Call with any changes or concerns.     Patient was given instructions and counseling regarding her condition or for health maintenance advice. Please see specific information pulled into the AVS if appropriate.

## 2023-10-04 ENCOUNTER — TRANSCRIBE ORDERS (OUTPATIENT)
Dept: ADMINISTRATIVE | Facility: HOSPITAL | Age: 68
End: 2023-10-04
Payer: MEDICARE

## 2023-10-04 DIAGNOSIS — Z12.31 SCREENING MAMMOGRAM, ENCOUNTER FOR: Primary | ICD-10-CM

## 2023-10-26 ENCOUNTER — PREP FOR SURGERY (OUTPATIENT)
Dept: OTHER | Facility: HOSPITAL | Age: 68
End: 2023-10-26
Payer: MEDICARE

## 2023-10-26 ENCOUNTER — CLINICAL SUPPORT (OUTPATIENT)
Dept: GASTROENTEROLOGY | Facility: CLINIC | Age: 68
End: 2023-10-26
Payer: MEDICARE

## 2023-10-26 ENCOUNTER — TELEPHONE (OUTPATIENT)
Dept: GASTROENTEROLOGY | Facility: CLINIC | Age: 68
End: 2023-10-26
Payer: MEDICARE

## 2023-10-26 DIAGNOSIS — Z12.11 ENCOUNTER FOR SCREENING FOR MALIGNANT NEOPLASM OF COLON: Primary | ICD-10-CM

## 2023-10-26 DIAGNOSIS — Z86.010 HISTORY OF COLON POLYPS: ICD-10-CM

## 2023-10-26 DIAGNOSIS — Z80.0 FAMILY HISTORY OF COLON CANCER IN FATHER: ICD-10-CM

## 2023-10-26 PROBLEM — Z86.0100 HISTORY OF COLON POLYPS: Status: ACTIVE | Noted: 2023-10-26

## 2023-10-26 RX ORDER — SODIUM PICOSULFATE, MAGNESIUM OXIDE, AND ANHYDROUS CITRIC ACID 12; 3.5; 1 G/175ML; G/175ML; MG/175ML
175 LIQUID ORAL TAKE AS DIRECTED
Qty: 350 ML | Refills: 0 | Status: SHIPPED | OUTPATIENT
Start: 2023-10-26

## 2023-10-26 RX ORDER — VALACYCLOVIR HYDROCHLORIDE 1 G/1
TABLET, FILM COATED ORAL
COMMUNITY
Start: 2023-10-09

## 2023-10-26 NOTE — TELEPHONE ENCOUNTER
"Hub staff attempted to follow warm transfer process and was unsuccessful     Caller: Radha Bocanegra \"Elizabeth\"    Relationship to patient: Self    Best call back number: [phone]    Patient is needing: PT WAS RETURNING CAROL'S CALL FROM [facility] TODAY        "

## 2023-10-26 NOTE — TELEPHONE ENCOUNTER
Attempted to contact patient early for telephone screening appointment. LVM requesting patient to call the office if she is able to do the call now.

## 2023-10-26 NOTE — PROGRESS NOTES
Radha Bocanegra  1955  68 y.o.    Reason for call: Recall- 5 yr recall, hx colon polyps, fm hx colon cancer, last colon 2018- [facility]  Prep prescribed: Clenpiq  Prep instructions reviewed with patient and sent to patient via [email or web address]  Is the patient currently on any injectable medications for weight loss or diabetes? No  Clearance needed? Yes  If yes, what clearance is needed? Blood thinner- Effient  Clearance has been requested from [facility]  The patient has been scheduled for: Colonoscopy  Family history of colon cancer? Yes  If yes, indicate relative: Father  Family History   Problem Relation Age of Onset    Osteoporosis Mother     Cancer Father         Colon cancer    Colon cancer Father          from colon cancer    Anesthesia problems Sister      Past Medical History:   Diagnosis Date    Ankle sprain     Clotting disorder     On blood thinner    Colon polyp     Found some last time    Dislocation of finger     Fracture of wrist 1967    Fracture, femur 2023    GERD (gastroesophageal reflux disease)     Hyperlipidemia     Hypertension     Knee swelling     Osteoporosis     Seasonal allergies     Tear of meniscus of knee      Allergies   Allergen Reactions    Advil [Ibuprofen] Hives     Past Surgical History:   Procedure Laterality Date    COLONOSCOPY      You all done the colonoscopy    HAND SURGERY      HYSTERECTOMY N/A     KNEE SURGERY       Social History     Socioeconomic History    Marital status:    Tobacco Use    Smoking status: Former     Packs/day: 1.00     Years: 15.00     Additional pack years: 0.00     Total pack years: 15.00     Types: Cigarettes     Start date: 1998     Quit date: 2016     Years since quittin.5     Passive exposure: Past    Smokeless tobacco: Never   Vaping Use    Vaping Use: Never used   Substance and Sexual Activity    Alcohol use: Not Currently     Comment: occ    Drug use: Never    Sexual activity: Yes     Partners: Male     Birth  control/protection: Hysterectomy       Current Outpatient Medications:     alendronate (FOSAMAX) 70 MG tablet, , Disp: , Rfl:     amLODIPine (NORVASC) 10 MG tablet, Take 1 tablet by mouth Daily., Disp: , Rfl:     aspirin 81 MG chewable tablet, Chew 1 tablet Daily., Disp: , Rfl:     buPROPion SR (WELLBUTRIN SR) 100 MG 12 hr tablet, , Disp: , Rfl:     cyclobenzaprine (FLEXERIL) 10 MG tablet, , Disp: , Rfl:     fexofenadine-pseudoephedrine (ALLEGRA-D 24) 180-240 MG per 24 hr tablet, Take 1 tablet by mouth Daily., Disp: , Rfl:     hydroCHLOROthiazide (HYDRODIURIL) 25 MG tablet, , Disp: , Rfl:     HYDROcodone-acetaminophen (NORCO) 7.5-325 MG per tablet, Take 1 tablet by mouth Every 6 (Six) Hours As Needed for Moderate Pain., Disp: 40 tablet, Rfl: 0    losartan (COZAAR) 100 MG tablet, , Disp: , Rfl:     metoprolol succinate XL (TOPROL-XL) 100 MG 24 hr tablet, , Disp: , Rfl:     prasugrel (EFFIENT) 10 MG tablet, Take 1 tablet by mouth Daily., Disp: , Rfl:     rosuvastatin (CRESTOR) 10 MG tablet, , Disp: , Rfl:     spironolactone (ALDACTONE) 25 MG tablet, , Disp: , Rfl:     valACYclovir (VALTREX) 1000 MG tablet, , Disp: , Rfl:     Vitamin D, Cholecalciferol, (CHOLECALCIFEROL) 10 MCG (400 UNIT) tablet, Take 1 tablet by mouth Daily., Disp: , Rfl:

## 2023-11-28 ENCOUNTER — TRANSCRIBE ORDERS (OUTPATIENT)
Dept: ADMINISTRATIVE | Facility: HOSPITAL | Age: 68
End: 2023-11-28
Payer: MEDICARE

## 2023-11-28 DIAGNOSIS — M81.0 OSTEOPOROSIS, POST-MENOPAUSAL: Primary | ICD-10-CM

## 2023-12-13 NOTE — PRE-PROCEDURE INSTRUCTIONS
"Instructed on date and arrival time of 0730. Come to entrance \"C\". Must have  over age 18 to drive home.  May have two visitors; however, children under 12 must stay in waiting room.  Discussed clear liquid diet (no red or purple), bowel prep, and NPO.  May take medications as usual except for blood thinners, diabetic medications, and weight loss medications.  Bring list of medications.  Verbalized understanding of instructions given.  Instructed to call for questions or concerns.  Will need to hold Effient per cardiologist's order.  Awaiting clearance.  " Add Progress Note...

## 2023-12-20 ENCOUNTER — ANESTHESIA EVENT (OUTPATIENT)
Dept: GASTROENTEROLOGY | Facility: HOSPITAL | Age: 68
End: 2023-12-20
Payer: MEDICARE

## 2023-12-20 NOTE — ANESTHESIA PREPROCEDURE EVALUATION
Anesthesia Evaluation     NPO Solid Status: > 8 hours  NPO Liquid Status: > 2 hours           Airway   Mallampati: III  No difficulty expected  Dental      Pulmonary - normal exam   (+) a smoker (remote Hx) Former,  Cardiovascular - normal exam    (+) hypertension, hyperlipidemia      Neuro/Psych- negative ROS  GI/Hepatic/Renal/Endo    (+) obesity, GERD    Musculoskeletal     Abdominal    Substance History      OB/GYN          Other - negative ROS                       Anesthesia Plan    ASA 2     general   total IV anesthesia    Anesthetic plan, risks, benefits, and alternatives have been provided, discussed and informed consent has been obtained with: patient and spouse/significant other.  Pre-procedure education provided  Plan discussed with CRNA.        CODE STATUS:

## 2023-12-21 ENCOUNTER — ANESTHESIA (OUTPATIENT)
Dept: GASTROENTEROLOGY | Facility: HOSPITAL | Age: 68
End: 2023-12-21
Payer: MEDICARE

## 2023-12-21 ENCOUNTER — HOSPITAL ENCOUNTER (OUTPATIENT)
Facility: HOSPITAL | Age: 68
Setting detail: HOSPITAL OUTPATIENT SURGERY
Discharge: HOME | End: 2023-12-21
Attending: INTERNAL MEDICINE | Admitting: INTERNAL MEDICINE
Payer: MEDICARE

## 2023-12-21 VITALS
WEIGHT: 210.76 LBS | TEMPERATURE: 98.1 F | BODY MASS INDEX: 36.18 KG/M2 | OXYGEN SATURATION: 96 % | SYSTOLIC BLOOD PRESSURE: 107 MMHG | RESPIRATION RATE: 20 BRPM | HEART RATE: 71 BPM | DIASTOLIC BLOOD PRESSURE: 69 MMHG

## 2023-12-21 DIAGNOSIS — Z80.0 FAMILY HISTORY OF COLON CANCER IN FATHER: ICD-10-CM

## 2023-12-21 DIAGNOSIS — Z86.010 HISTORY OF COLON POLYPS: ICD-10-CM

## 2023-12-21 DIAGNOSIS — Z12.11 ENCOUNTER FOR SCREENING FOR MALIGNANT NEOPLASM OF COLON: ICD-10-CM

## 2023-12-21 PROCEDURE — 25810000003 LACTATED RINGERS PER 1000 ML: Performed by: ANESTHESIOLOGY

## 2023-12-21 PROCEDURE — 88305 TISSUE EXAM BY PATHOLOGIST: CPT | Performed by: INTERNAL MEDICINE

## 2023-12-21 PROCEDURE — 25010000002 PROPOFOL 10 MG/ML EMULSION

## 2023-12-21 RX ORDER — LIDOCAINE HYDROCHLORIDE 20 MG/ML
INJECTION, SOLUTION EPIDURAL; INFILTRATION; INTRACAUDAL; PERINEURAL AS NEEDED
Status: DISCONTINUED | OUTPATIENT
Start: 2023-12-21 | End: 2023-12-21 | Stop reason: SURG

## 2023-12-21 RX ORDER — PROPOFOL 10 MG/ML
VIAL (ML) INTRAVENOUS AS NEEDED
Status: DISCONTINUED | OUTPATIENT
Start: 2023-12-21 | End: 2023-12-21 | Stop reason: SURG

## 2023-12-21 RX ORDER — SODIUM CHLORIDE, SODIUM LACTATE, POTASSIUM CHLORIDE, CALCIUM CHLORIDE 600; 310; 30; 20 MG/100ML; MG/100ML; MG/100ML; MG/100ML
30 INJECTION, SOLUTION INTRAVENOUS CONTINUOUS
Status: DISCONTINUED | OUTPATIENT
Start: 2023-12-21 | End: 2023-12-21 | Stop reason: HOSPADM

## 2023-12-21 RX ADMIN — SODIUM CHLORIDE, POTASSIUM CHLORIDE, SODIUM LACTATE AND CALCIUM CHLORIDE 30 ML/HR: 600; 310; 30; 20 INJECTION, SOLUTION INTRAVENOUS at 07:56

## 2023-12-21 RX ADMIN — PROPOFOL 60 MG: 10 INJECTION, EMULSION INTRAVENOUS at 09:36

## 2023-12-21 RX ADMIN — LIDOCAINE HYDROCHLORIDE 50 MG: 20 INJECTION, SOLUTION EPIDURAL; INFILTRATION; INTRACAUDAL; PERINEURAL at 09:36

## 2023-12-21 RX ADMIN — PROPOFOL 175 MCG/KG/MIN: 10 INJECTION, EMULSION INTRAVENOUS at 09:36

## 2023-12-21 NOTE — H&P
ScreeningPre Procedure History & Physical    Chief Complaint:   Screening     Subjective     HPI:   Screening     Past Medical History:   Past Medical History:   Diagnosis Date    Ankle sprain     Clotting disorder     On blood thinner    Colon polyp     Found some last time    Dislocation of finger     Fracture of wrist 1967    Fracture, femur 2023    GERD (gastroesophageal reflux disease)     Hyperlipidemia     Hypertension     Knee swelling     Osteoporosis     Seasonal allergies     Tear of meniscus of knee        Past Surgical History:  Past Surgical History:   Procedure Laterality Date    COLONOSCOPY      You all done the colonoscopy    HAND SURGERY      HYSTERECTOMY N/A     KNEE SURGERY         Family History:  Family History   Problem Relation Age of Onset    Osteoporosis Mother     Cancer Father         Colon cancer    Colon cancer Father          from colon cancer    Anesthesia problems Sister        Social History:   reports that she quit smoking about 7 years ago. Her smoking use included cigarettes. She started smoking about 25 years ago. She has a 15.00 pack-year smoking history. She has been exposed to tobacco smoke. She has never used smokeless tobacco. She reports that she does not currently use alcohol. She reports that she does not use drugs.    Medications:   Medications Prior to Admission   Medication Sig Dispense Refill Last Dose    amLODIPine (NORVASC) 10 MG tablet Take 1 tablet by mouth Daily.   2023    aspirin 81 MG chewable tablet Chew 1 tablet Daily.   2023    buPROPion SR (WELLBUTRIN SR) 100 MG 12 hr tablet    2023    hydroCHLOROthiazide (HYDRODIURIL) 25 MG tablet    2023    losartan (COZAAR) 100 MG tablet    2023    metoprolol succinate XL (TOPROL-XL) 100 MG 24 hr tablet    2023    rosuvastatin (CRESTOR) 10 MG tablet    2023    spironolactone (ALDACTONE) 25 MG tablet    2023    Vitamin D, Cholecalciferol,  (CHOLECALCIFEROL) 10 MCG (400 UNIT) tablet Take 1 tablet by mouth Daily.   12/20/2023    cyclobenzaprine (FLEXERIL) 10 MG tablet    Unknown    fexofenadine-pseudoephedrine (ALLEGRA-D 24) 180-240 MG per 24 hr tablet Take 1 tablet by mouth Daily.   Unknown    HYDROcodone-acetaminophen (NORCO) 7.5-325 MG per tablet Take 1 tablet by mouth Every 6 (Six) Hours As Needed for Moderate Pain. 40 tablet 0 Unknown    prasugrel (EFFIENT) 10 MG tablet Take 1 tablet by mouth Daily.   12/16/2023    valACYclovir (VALTREX) 1000 MG tablet    Unknown       Allergies:  Advil [ibuprofen]        Objective     Blood pressure 121/76, pulse 69, temperature 98.2 °F (36.8 °C), temperature source Temporal, resp. rate 16, weight 95.6 kg (210 lb 12.2 oz), SpO2 96%.    Physical Exam   Constitutional: Pt is oriented to person, place, and time and well-developed, well-nourished, and in no distress.   Mouth/Throat: Oropharynx is clear and moist.   Neck: Normal range of motion.   Cardiovascular: Normal rate, regular rhythm and normal heart sounds.    Pulmonary/Chest: Effort normal and breath sounds normal.   Abdominal: Soft. Nontender  Skin: Skin is warm and dry.   Psychiatric: Mood, memory, affect and judgment normal.     Assessment & Plan     Diagnosis:  Screening colonoscopy  H/o colon polyps  Family h/o colon cancer     Anticipated Surgical Procedure:  colonoscopy    The risks, benefits, and alternatives of this procedure have been discussed with the patient or the responsible party- the patient understands and agrees to proceed.

## 2023-12-21 NOTE — ANESTHESIA POSTPROCEDURE EVALUATION
Patient: Radha Bocanegra    Procedure Summary       Date: 12/21/23 Room / Location: [facility] ENDOSCOPY 2 / [facility] ENDOSCOPY    Anesthesia Start: 0936 Anesthesia Stop: 1012    Procedure: COLONOSCOPY WITH POLYPECTOMY Diagnosis:       Encounter for screening for malignant neoplasm of colon      Family history of colon cancer in father      History of colon polyps      (Encounter for screening for malignant neoplasm of colon [Z12.11])      (Family history of colon cancer in father [Z80.0])      (History of colon polyps [Z86.010])    Surgeons: Sudheer Kenney MD Provider: Louie Hall CRNA    Anesthesia Type: general ASA Status: 2            Anesthesia Type: general    Vitals  Vitals Value Taken Time   /69 12/21/23 1026   Temp 36.7 °C (98.1 °F) 12/21/23 1012   Pulse 69 12/21/23 1028   Resp 20 12/21/23 1026   SpO2 97 % 12/21/23 1027   Vitals shown include unfiled device data.        Post Anesthesia Care and Evaluation    Post-procedure mental status: acceptable.  Pain management: satisfactory to patient    Airway patency: patent  Anesthetic complications: No anesthetic complications    Cardiovascular status: acceptable  Respiratory status: acceptable    Comments: Per chart review

## 2023-12-26 LAB
CYTO UR: NORMAL
LAB AP CASE REPORT: NORMAL
LAB AP CLINICAL INFORMATION: NORMAL
PATH REPORT.FINAL DX SPEC: NORMAL
PATH REPORT.GROSS SPEC: NORMAL

## 2024-01-12 ENCOUNTER — HOSPITAL ENCOUNTER (OUTPATIENT)
Dept: MAMMOGRAPHY | Facility: HOSPITAL | Age: 69
Discharge: HOME | End: 2024-01-12
Admitting: FAMILY MEDICINE
Payer: MEDICARE

## 2024-01-12 DIAGNOSIS — Z12.31 SCREENING MAMMOGRAM, ENCOUNTER FOR: ICD-10-CM

## 2024-01-12 PROCEDURE — 77067 SCR MAMMO BI INCL CAD: CPT

## 2024-01-12 PROCEDURE — 77063 BREAST TOMOSYNTHESIS BI: CPT

## 2024-03-11 ENCOUNTER — OFFICE VISIT (OUTPATIENT)
Dept: ORTHOPEDIC SURGERY | Facility: CLINIC | Age: 69
End: 2024-03-11
Payer: MEDICARE

## 2024-03-11 VITALS
DIASTOLIC BLOOD PRESSURE: 77 MMHG | WEIGHT: 210 LBS | OXYGEN SATURATION: 94 % | HEIGHT: 64 IN | SYSTOLIC BLOOD PRESSURE: 118 MMHG | HEART RATE: 62 BPM | BODY MASS INDEX: 35.85 KG/M2

## 2024-03-11 DIAGNOSIS — M79.642 LEFT HAND PAIN: Primary | ICD-10-CM

## 2024-03-11 DIAGNOSIS — M18.12 OSTEOARTHRITIS OF CARPOMETACARPAL (CMC) JOINT OF LEFT THUMB, UNSPECIFIED OSTEOARTHRITIS TYPE: ICD-10-CM

## 2024-03-11 PROCEDURE — 99213 OFFICE O/P EST LOW 20 MIN: CPT | Performed by: ORTHOPAEDIC SURGERY

## 2024-03-11 NOTE — PROGRESS NOTES
"Chief Complaint  Initial Evaluation of the Left Hand     Subjective      Radha Bocanegra presents to [facility] for initial evaluation of the left hand.  She is having pain in the left thumb.  She has pain with  and FMC tasks.  She has had pain for about a month and gotten worse the last couple of weeks.  She denies any injury. She has a little knot at the base of the thumb and she has been massaging and has decreased in size.     Allergies   Allergen Reactions    Advil [Ibuprofen] Hives        Social History     Socioeconomic History    Marital status:    Tobacco Use    Smoking status: Former     Current packs/day: 0.00     Average packs/day: 1 pack/day for 17.7 years (17.7 ttl pk-yrs)     Types: Cigarettes     Start date: 1998     Quit date: 2016     Years since quittin.9     Passive exposure: Past    Smokeless tobacco: Never   Vaping Use    Vaping status: Never Used   Substance and Sexual Activity    Alcohol use: Not Currently     Comment: occ    Drug use: Never    Sexual activity: Yes     Partners: Male     Birth control/protection: Hysterectomy        I reviewed the patient's chief complaint, history of present illness, review of systems, past medical history, surgical history, family history, social history, medications, and allergy list.     Review of Systems     Constitutional: Denies fevers, chills, weight loss  Cardiovascular: Denies chest pain, shortness of breath  Skin: Denies rashes, acute skin changes  Neurologic: Denies headache, loss of consciousness        Vital Signs:   /77   Pulse 62   Ht 162.6 cm (64\")   Wt 95.3 kg (210 lb)   SpO2 94%   BMI 36.05 kg/m²          Physical Exam  General: Alert. No acute distress    Ortho Exam        LEFT HAND Negative Compression testing/ Negative Tinels. NegativeFinkelsteins. Negative Chaves's testing. Positive CMC grind testing. Negative Phalens. Full ROM of the hand, fingers, elbow and wrist. Negative " Triggering of the digit. Sensation grossly intact to light touch, median, radial and ulnar nerve. Positive AIN, PIN and ulnar nerve motor function intact. Axillary nerve intact. Positive pulses.        Procedures        Imaging Results (Most Recent)       Procedure Component Value Units Date/Time    XR Finger 2+ View Left [[id number]] Resulted: 03/11/24 1021     Updated: 03/11/24 1031             Result Review :     X-Ray Report:  Left wrist  X-Ray  Indication: Evaluation of the left wrist   AP/Lateral view(s)  Findings: Mild to moderate CMC joint arthritis of the thumb.   Prior studies available for comparison: no             Assessment and Plan     Diagnoses and all orders for this visit:    1. Left hand pain (Primary)  -     XR Finger 2+ View Left    2. Osteoarthritis of carpometacarpal (CMC) joint of left thumb, unspecified osteoarthritis type        Discussed the treatment plan with the patient. I reviewed the X-rays that were obtained today with the patient.     Thumb brace brace given.  Prescribed anti inflammatory.     Discussed injection.  She wants to wait on injection at this time.     Modify activity as needed.       Call or return if worsening symptoms.    Follow Up     PRN      Patient was given instructions and counseling regarding her condition or for health maintenance advice. Please see specific information pulled into the AVS if appropriate.     Scribed for Timo Gamez MD by Fiorella Dias MA.  03/11/24   10:36 EDT    I have personally performed the services described in this document as scribed by the above individual and it is both accurate and complete. Timo Gamez MD 03/11/24

## 2024-04-25 ENCOUNTER — OFFICE VISIT (OUTPATIENT)
Dept: CARDIOLOGY | Facility: CLINIC | Age: 69
End: 2024-04-25
Payer: MEDICARE

## 2024-04-25 VITALS
HEIGHT: 64 IN | DIASTOLIC BLOOD PRESSURE: 77 MMHG | WEIGHT: 218 LBS | HEART RATE: 62 BPM | SYSTOLIC BLOOD PRESSURE: 110 MMHG | BODY MASS INDEX: 37.22 KG/M2

## 2024-04-25 DIAGNOSIS — E66.01 SEVERE OBESITY (BMI 35.0-39.9) WITH COMORBIDITY: ICD-10-CM

## 2024-04-25 DIAGNOSIS — I10 ESSENTIAL HYPERTENSION: ICD-10-CM

## 2024-04-25 DIAGNOSIS — Z98.61 CAD S/P PERCUTANEOUS CORONARY ANGIOPLASTY: Primary | ICD-10-CM

## 2024-04-25 DIAGNOSIS — E78.2 MIXED DYSLIPIDEMIA: ICD-10-CM

## 2024-04-25 DIAGNOSIS — I25.10 CAD S/P PERCUTANEOUS CORONARY ANGIOPLASTY: Primary | ICD-10-CM

## 2024-04-25 RX ORDER — ROSUVASTATIN CALCIUM 20 MG/1
20 TABLET, COATED ORAL DAILY
Qty: 90 TABLET | Refills: 3 | Status: SHIPPED | OUTPATIENT
Start: 2024-04-25

## 2024-04-25 RX ORDER — FAMOTIDINE 40 MG/1
40 TABLET, FILM COATED ORAL DAILY
COMMUNITY

## 2024-04-25 RX ORDER — PANTOPRAZOLE SODIUM 20 MG/1
20 TABLET, DELAYED RELEASE ORAL DAILY
COMMUNITY

## 2024-04-25 NOTE — PROGRESS NOTES
Chief Complaint  HX of Heart Attack and Establish Care      History of Present Illness  Radha Bocanegra presents to [facility]    This is a very pleasant 69-year-old lady with past medical history significant for myocardial infarction in 2020, status post PCI to the LAD using drug-eluting stent, obesity, hypertension, dyslipidemia presents to clinic to establish new cardiologist.  Her previous cardiologist had recently retired.  The patient is doing well overall.  She has no complaints or concerns today.  She has no chest discomfort or significant dyspnea.  She has no palpitations, dizziness, presyncope or syncope.  She is not very physically active.    Past Medical History:   Diagnosis Date    Ankle sprain 1972    Clotting disorder     On blood thinner    Colon polyp     Found some last time    Dislocation of finger 2012    Fracture of wrist 1967    Fracture, femur 03/23/2023    GERD (gastroesophageal reflux disease)     Hyperlipidemia     Hypertension     Knee swelling     Osteoporosis     Seasonal allergies     Tear of meniscus of knee 2012         Current Outpatient Medications:     albuterol sulfate  (90 Base) MCG/ACT inhaler, Inhale 2 puffs Every 6 (Six) Hours As Needed for Wheezing., Disp: 18 g, Rfl: 0    amLODIPine (NORVASC) 10 MG tablet, Take 1 tablet by mouth Daily., Disp: , Rfl:     aspirin 81 MG chewable tablet, Chew 1 tablet Daily., Disp: , Rfl:     benzonatate (TESSALON) 100 MG capsule, Take 1 capsule by mouth 3 (Three) Times a Day As Needed for Cough., Disp: 30 capsule, Rfl: 0    buPROPion SR (WELLBUTRIN SR) 100 MG 12 hr tablet, , Disp: , Rfl:     cyclobenzaprine (FLEXERIL) 10 MG tablet, , Disp: , Rfl:     famotidine (PEPCID) 40 MG tablet, Take 1 tablet by mouth Daily., Disp: , Rfl:     fexofenadine-pseudoephedrine (ALLEGRA-D 24) 180-240 MG per 24 hr tablet, Take 1 tablet by mouth Daily., Disp: , Rfl:     hydroCHLOROthiazide (HYDRODIURIL) 25 MG tablet, , Disp: , Rfl:  "    HYDROcodone-acetaminophen (NORCO) 7.5-325 MG per tablet, Take 1 tablet by mouth Every 6 (Six) Hours As Needed for Moderate Pain., Disp: 40 tablet, Rfl: 0    losartan (COZAAR) 100 MG tablet, , Disp: , Rfl:     metoprolol succinate XL (TOPROL-XL) 100 MG 24 hr tablet, , Disp: , Rfl:     pantoprazole (PROTONIX) 20 MG EC tablet, Take 1 tablet by mouth Daily., Disp: , Rfl:     rosuvastatin (CRESTOR) 20 MG tablet, Take 1 tablet by mouth Daily., Disp: 90 tablet, Rfl: 3    spironolactone (ALDACTONE) 25 MG tablet, , Disp: , Rfl:     valACYclovir (VALTREX) 1000 MG tablet, , Disp: , Rfl:     Vitamin D, Cholecalciferol, (CHOLECALCIFEROL) 10 MCG (400 UNIT) tablet, Take 1 tablet by mouth Daily., Disp: , Rfl:     Medications Discontinued During This Encounter   Medication Reason    prasugrel (EFFIENT) 10 MG tablet     rosuvastatin (CRESTOR) 10 MG tablet Reorder     Allergies   Allergen Reactions    Advil [Ibuprofen] Hives        Social History     Tobacco Use    Smoking status: Former     Current packs/day: 0.00     Average packs/day: 1 pack/day for 17.7 years (17.7 ttl pk-yrs)     Types: Cigarettes     Start date: 1998     Quit date: 2016     Years since quittin.0     Passive exposure: Past    Smokeless tobacco: Never   Vaping Use    Vaping status: Never Used   Substance Use Topics    Alcohol use: Not Currently     Comment: occ    Drug use: Never       Family History   Problem Relation Age of Onset    Osteoporosis Mother     Cancer Father         Colon cancer    Colon cancer Father          from colon cancer    Anesthesia problems Sister         Objective     /77   Pulse 62   Ht 162.6 cm (64\")   Wt 98.9 kg (218 lb)   BMI 37.42 kg/m²       Physical Exam  Constitutional:       General: Awake. Not in acute distress.     Appearance: Normal appearance.   Neck:      Vascular: No carotid bruit, hepatojugular reflux or JVD.   Cardiovascular:      Rate and Rhythm: Normal rate and regular rhythm.      Chest " "Wall: PMI is not displaced.      Heart sounds: Normal heart sounds, S1 normal and S2 normal. No murmur heard.   No friction rub. No gallop. No S3 or S4 sounds.    Pulmonary:      Effort: Pulmonary effort is normal.      Breath sounds: Normal breath sounds. No wheezing, rhonchi or rales.   Ext.      No edema.   Skin:     General: Skin is warm and dry.      Coloration: Skin is not cyanotic.      Findings: No petechiae or rash.   Neurological:      Mental Status: Alert and oriented x 3  Psychiatric:         Behavior: Behavior is cooperative.       Result Review :     proBNP   Date Value Ref Range Status   11/10/2022 <36.0 0.0 - 900.0 pg/mL Final     CMP          6/22/2023    08:30   CMP   Glucose 103    BUN 16    Creatinine 1.03    EGFR 59.7    Sodium 138    Potassium 3.7    Chloride 100    Calcium 9.5    Total Protein 7.4    Albumin 4.7    Globulin 2.7    Total Bilirubin 0.4    Alkaline Phosphatase 120    AST (SGOT) 18    ALT (SGPT) 20    Albumin/Globulin Ratio 1.7    BUN/Creatinine Ratio 15.5    Anion Gap 12.0         Lab Results   Component Value Date    TSH 2.270 02/24/2022      Lab Results   Component Value Date    FREET4 1.08 02/24/2022      No results found for: \"DDIMERQUANT\"  Magnesium   Date Value Ref Range Status   11/10/2022 2.1 1.6 - 2.4 mg/dL Final      No results found for: \"DIGOXIN\"   Lab Results   Component Value Date    TROPONINT <0.01 04/21/2021      Lab Results   Component Value Date    POCTROP 0.00 11/10/2022   (       Lipid Panel          6/22/2023    08:30   Lipid Panel   Total Cholesterol 151    Triglycerides 133    HDL Cholesterol 53    VLDL Cholesterol 23    LDL Cholesterol  75    LDL/HDL Ratio 1.35                No results found for this or any previous visit.                Diagnoses and all orders for this visit:    1. CAD S/P percutaneous coronary angioplasty (Primary)    2. Essential hypertension    3. Mixed dyslipidemia    4. Severe obesity (BMI 35.0-39.9) with comorbidity    Other " orders  -     rosuvastatin (CRESTOR) 20 MG tablet; Take 1 tablet by mouth Daily.  Dispense: 90 tablet; Refill: 3      Assessment:    CAD: She has history of myocardial infarction in 2020, status post PCI to the LAD.  She has been doing well since then without angina or anginal-like symptoms.  Prasugrel will be discontinued.  Rosuvastatin dose will be increased to 20 mg daily for better LDL control.  She will be continued on aspirin and metoprolol.    Essential hypertension: Well-controlled on current regimen.  Continue same.    Mixed dyslipidemia: LDL 75.  Rosuvastatin dose will be increased as discussed above.  Repeat lipid profile in 2 to 3 months.    Obesity: Lifestyle changes including regular exercise, dietary changes and weight loss were discussed with the patient.  She was encouraged to lose 25 pounds over the next 6 months.    Follow Up       Return in about 6 months (around 10/25/2024) for With Cindy CARVAJAL.    Patient was given instructions and counseling regarding her condition or for health maintenance advice. Please see specific information pulled into the AVS if appropriate.

## 2024-08-19 ENCOUNTER — HOSPITAL ENCOUNTER (OUTPATIENT)
Dept: BONE DENSITY | Facility: HOSPITAL | Age: 69
Discharge: HOME | End: 2024-08-19
Admitting: FAMILY MEDICINE
Payer: MEDICARE

## 2024-08-19 DIAGNOSIS — M81.0 OSTEOPOROSIS, POST-MENOPAUSAL: ICD-10-CM

## 2024-08-19 PROCEDURE — 77080 DXA BONE DENSITY AXIAL: CPT

## 2024-10-25 ENCOUNTER — OFFICE VISIT (OUTPATIENT)
Dept: CARDIOLOGY | Facility: CLINIC | Age: 69
End: 2024-10-25
Payer: MEDICARE

## 2024-10-25 VITALS
DIASTOLIC BLOOD PRESSURE: 77 MMHG | WEIGHT: 186.6 LBS | BODY MASS INDEX: 31.86 KG/M2 | HEART RATE: 75 BPM | HEIGHT: 64 IN | SYSTOLIC BLOOD PRESSURE: 113 MMHG

## 2024-10-25 DIAGNOSIS — E78.2 MIXED DYSLIPIDEMIA: ICD-10-CM

## 2024-10-25 DIAGNOSIS — I10 ESSENTIAL HYPERTENSION: ICD-10-CM

## 2024-10-25 DIAGNOSIS — I25.10 CAD S/P PERCUTANEOUS CORONARY ANGIOPLASTY: Primary | ICD-10-CM

## 2024-10-25 DIAGNOSIS — Z98.61 CAD S/P PERCUTANEOUS CORONARY ANGIOPLASTY: Primary | ICD-10-CM

## 2024-10-25 RX ORDER — LOSARTAN POTASSIUM 25 MG/1
25 TABLET ORAL DAILY
Qty: 30 TABLET | Refills: 3 | Status: SHIPPED | OUTPATIENT
Start: 2024-10-25

## 2024-10-25 RX ORDER — FLUTICASONE PROPIONATE 50 UG/1
2 SPRAY, METERED NASAL DAILY
COMMUNITY
Start: 2024-10-15

## 2024-10-25 NOTE — PROGRESS NOTES
Chief Complaint  CAD S/P percutaneous coronary angioplasty and Follow-up (6 mo f/u. )    Subjective        History of Present Illness  Radha Bocanegra presents to [facility] for follow up.   Patient is a 69-year-old female with past medical history outlined below, significant for MI in 2020 status post PCI to the LAD using ANGY, obesity, hypertension, hyperlipidemia who presents for routine follow-up.  She is doing well from a cardiac standpoint.  She has no complaints or concerns today.  She denies chest pain or discomfort, dyspnea, palpitations, edema or syncope.    Past Medical History:   Diagnosis Date    Ankle sprain     Clotting disorder     On blood thinner    Colon polyp     Found some last time    Dislocation of finger     Fracture of wrist 1967    Fracture, femur 2023    GERD (gastroesophageal reflux disease)     Hyperlipidemia     Hypertension     Knee swelling     Myocardial infarction 21    Osteoporosis     Seasonal allergies     Tear of meniscus of knee        ALLERGY  Allergies   Allergen Reactions    Advil [Ibuprofen] Hives        Past Surgical History:   Procedure Laterality Date    CAROTID STENT      COLONOSCOPY      You all done the colonoscopy    COLONOSCOPY N/A 2023    Procedure: COLONOSCOPY WITH POLYPECTOMY;  Surgeon: Sudheer Kenney MD;  Location: [facility];  Service: Gastroenterology;  Laterality: N/A;  COLON POLYP, DIVERTICULOSIS    CORONARY STENT PLACEMENT      HAND SURGERY      HYSTERECTOMY N/A     KNEE SURGERY          Social History     Socioeconomic History    Marital status:    Tobacco Use    Smoking status: Former     Current packs/day: 0.00     Average packs/day: 1.1 packs/day for 23.6 years (26.5 ttl pk-yrs)     Types: Cigarettes     Start date: 1998     Quit date: 2016     Years since quittin.5     Passive exposure: Past    Smokeless tobacco: Never   Vaping Use    Vaping status: Never  Used   Substance and Sexual Activity    Alcohol use: Yes     Comment: Once twice month    Drug use: Never    Sexual activity: Yes     Partners: Male     Birth control/protection: None, Hysterectomy       Family History   Problem Relation Age of Onset    Osteoporosis Mother     Cancer Father         Colon cancer    Colon cancer Father          from colon cancer    Heart attack Father     Anesthesia problems Sister         Current Outpatient Medications on File Prior to Visit   Medication Sig    albuterol sulfate  (90 Base) MCG/ACT inhaler Inhale 2 puffs Every 6 (Six) Hours As Needed for Wheezing.    aspirin 81 MG chewable tablet Chew 1 tablet Daily.    benzonatate (TESSALON) 100 MG capsule Take 1 capsule by mouth 3 (Three) Times a Day As Needed for Cough.    buPROPion SR (WELLBUTRIN SR) 100 MG 12 hr tablet     cyclobenzaprine (FLEXERIL) 10 MG tablet     famotidine (PEPCID) 40 MG tablet Take 1 tablet by mouth Daily.    fexofenadine-pseudoephedrine (ALLEGRA-D 24) 180-240 MG per 24 hr tablet Take 1 tablet by mouth Daily.    fluticasone (FLONASE) 50 MCG/ACT nasal spray Administer 2 sprays into the nostril(s) as directed by provider Daily.    hydroCHLOROthiazide (HYDRODIURIL) 25 MG tablet     metoprolol succinate XL (TOPROL-XL) 100 MG 24 hr tablet     pantoprazole (PROTONIX) 20 MG EC tablet Take 1 tablet by mouth Daily.    rosuvastatin (CRESTOR) 20 MG tablet Take 1 tablet by mouth Daily.    SEMAGLUTIDE PO Inject 2.5 mg under the skin into the appropriate area as directed 1 (One) Time Per Week.    spironolactone (ALDACTONE) 25 MG tablet     valACYclovir (VALTREX) 1000 MG tablet     Vitamin D, Cholecalciferol, (CHOLECALCIFEROL) 10 MCG (400 UNIT) tablet Take 1 tablet by mouth Daily.    [DISCONTINUED] losartan (COZAAR) 100 MG tablet Take 0.5 tablets by mouth Daily.    [DISCONTINUED] amLODIPine (NORVASC) 10 MG tablet Take 1 tablet by mouth Daily.    [DISCONTINUED] HYDROcodone-acetaminophen (NORCO) 7.5-325 MG per  "tablet Take 1 tablet by mouth Every 6 (Six) Hours As Needed for Moderate Pain.     No current facility-administered medications on file prior to visit.       Objective   Vitals:    10/25/24 0841   BP: 113/77   Pulse: 75   Weight: 84.6 kg (186 lb 9.6 oz)   Height: 162.6 cm (64\")       Physical Exam  Constitutional:       General: She is awake. She is not in acute distress.     Appearance: Normal appearance.   HENT:      Head: Normocephalic.      Nose: Nose normal. No congestion.   Eyes:      Extraocular Movements: Extraocular movements intact.      Conjunctiva/sclera: Conjunctivae normal.      Pupils: Pupils are equal, round, and reactive to light.   Neck:      Thyroid: No thyromegaly.      Vascular: No JVD.   Cardiovascular:      Rate and Rhythm: Normal rate and regular rhythm.      Chest Wall: PMI is not displaced.      Pulses: Normal pulses.      Heart sounds: Normal heart sounds, S1 normal and S2 normal. No murmur heard.     No friction rub. No gallop. No S3 or S4 sounds.   Pulmonary:      Effort: Pulmonary effort is normal.      Breath sounds: Normal breath sounds. No wheezing, rhonchi or rales.   Abdominal:      General: Bowel sounds are normal.      Palpations: Abdomen is soft.      Tenderness: There is no abdominal tenderness.   Musculoskeletal:      Cervical back: No tenderness.      Right lower leg: No edema.      Left lower leg: No edema.   Lymphadenopathy:      Cervical: No cervical adenopathy.   Skin:     General: Skin is warm and dry.      Capillary Refill: Capillary refill takes less than 2 seconds.      Coloration: Skin is not cyanotic.      Findings: No petechiae or rash.      Nails: There is no clubbing.   Neurological:      Mental Status: She is alert.   Psychiatric:         Mood and Affect: Mood normal.         Behavior: Behavior is cooperative.           Result Review     The following data was reviewed by WEI Reyes on 10/25/24.                   No results found for this or any " previous visit.          Procedures      Assessment & Plan  CAD S/P percutaneous coronary angioplasty  Coronary artery disease with history of MI in 2020 status post PCI to the LAD.  Doing well since then with no angina or anginal-like symptoms.  Continue aspirin and metoprolol.  Essential hypertension  Blood pressure has been well-controlled.  She notes her blood pressure has been trending lower since she has been working on weight loss.  Decrease losartan to 25 mg daily.  Mixed dyslipidemia  Continue statin therapy.  Repeat lipid panel at next follow-up visit.  Lab Results   Component Value Date    LDL 75 06/22/2023                        The medical services provided during this encounter are part of ongoing care related to this patient's single serious condition or complex condition.  Fol  .low Up   Return in about 6 months (around 4/25/2025) for With Dr. Khan.    Patient was given instructions and counseling regarding her condition or for health maintenance advice. Please see specific information pulled into the AVS if appropriate.     Cindy Hawk, APRN  10/25/24  11:52 EDT    Dictated Utilizing Dragon Dictation

## 2024-12-10 ENCOUNTER — TRANSCRIBE ORDERS (OUTPATIENT)
Dept: ADMINISTRATIVE | Facility: HOSPITAL | Age: 69
End: 2024-12-10
Payer: MEDICARE

## 2024-12-10 DIAGNOSIS — Z12.31 VISIT FOR SCREENING MAMMOGRAM: Primary | ICD-10-CM

## 2025-01-31 RX ORDER — LOSARTAN POTASSIUM 25 MG/1
25 TABLET ORAL DAILY
Qty: 30 TABLET | Refills: 4 | Status: SHIPPED | OUTPATIENT
Start: 2025-01-31

## 2025-02-07 ENCOUNTER — HOSPITAL ENCOUNTER (OUTPATIENT)
Dept: MAMMOGRAPHY | Facility: HOSPITAL | Age: 70
Discharge: HOME | End: 2025-02-07
Admitting: FAMILY MEDICINE
Payer: MEDICARE

## 2025-02-07 DIAGNOSIS — Z12.31 VISIT FOR SCREENING MAMMOGRAM: ICD-10-CM

## 2025-02-07 PROCEDURE — 77067 SCR MAMMO BI INCL CAD: CPT

## 2025-02-07 PROCEDURE — 77063 BREAST TOMOSYNTHESIS BI: CPT

## 2025-04-25 ENCOUNTER — OFFICE VISIT (OUTPATIENT)
Dept: CARDIOLOGY | Facility: CLINIC | Age: 70
End: 2025-04-25
Payer: MEDICARE

## 2025-04-25 ENCOUNTER — APPOINTMENT (OUTPATIENT)
Dept: GENERAL RADIOLOGY | Facility: HOSPITAL | Age: 70
End: 2025-04-25
Payer: MEDICARE

## 2025-04-25 ENCOUNTER — HOSPITAL ENCOUNTER (EMERGENCY)
Facility: HOSPITAL | Age: 70
Discharge: HOME | End: 2025-04-25
Attending: EMERGENCY MEDICINE
Payer: MEDICARE

## 2025-04-25 VITALS
HEART RATE: 91 BPM | DIASTOLIC BLOOD PRESSURE: 45 MMHG | SYSTOLIC BLOOD PRESSURE: 101 MMHG | BODY MASS INDEX: 32.44 KG/M2 | WEIGHT: 190 LBS | HEIGHT: 64 IN

## 2025-04-25 VITALS
SYSTOLIC BLOOD PRESSURE: 124 MMHG | BODY MASS INDEX: 32.44 KG/M2 | HEIGHT: 64 IN | OXYGEN SATURATION: 100 % | DIASTOLIC BLOOD PRESSURE: 78 MMHG | RESPIRATION RATE: 18 BRPM | HEART RATE: 82 BPM | WEIGHT: 190 LBS | TEMPERATURE: 99 F

## 2025-04-25 DIAGNOSIS — I10 ESSENTIAL HYPERTENSION: ICD-10-CM

## 2025-04-25 DIAGNOSIS — R11.0 NAUSEA WITHOUT VOMITING: ICD-10-CM

## 2025-04-25 DIAGNOSIS — E87.6 HYPOKALEMIA: ICD-10-CM

## 2025-04-25 DIAGNOSIS — I25.10 CAD S/P PERCUTANEOUS CORONARY ANGIOPLASTY: Primary | ICD-10-CM

## 2025-04-25 DIAGNOSIS — Z98.61 CAD S/P PERCUTANEOUS CORONARY ANGIOPLASTY: Primary | ICD-10-CM

## 2025-04-25 DIAGNOSIS — E78.2 MIXED DYSLIPIDEMIA: ICD-10-CM

## 2025-04-25 DIAGNOSIS — E86.0 MILD DEHYDRATION: ICD-10-CM

## 2025-04-25 DIAGNOSIS — N39.0 ACUTE UTI: Primary | ICD-10-CM

## 2025-04-25 LAB
ALBUMIN SERPL-MCNC: 4.2 G/DL (ref 3.5–5.2)
ALBUMIN/GLOB SERPL: 1.1 G/DL
ALP SERPL-CCNC: 182 U/L (ref 39–117)
ALT SERPL W P-5'-P-CCNC: 33 U/L (ref 1–33)
ANION GAP SERPL CALCULATED.3IONS-SCNC: 17.7 MMOL/L (ref 5–15)
AST SERPL-CCNC: 38 U/L (ref 1–32)
BACTERIA UR QL AUTO: ABNORMAL /HPF
BASOPHILS # BLD AUTO: 0.02 10*3/MM3 (ref 0–0.2)
BASOPHILS NFR BLD AUTO: 0.2 % (ref 0–1.5)
BILIRUB SERPL-MCNC: 0.7 MG/DL (ref 0–1.2)
BILIRUB UR QL STRIP: ABNORMAL
BUN SERPL-MCNC: 13 MG/DL (ref 8–23)
BUN/CREAT SERPL: 10.8 (ref 7–25)
CALCIUM SPEC-SCNC: 8.7 MG/DL (ref 8.6–10.5)
CHLORIDE SERPL-SCNC: 96 MMOL/L (ref 98–107)
CLARITY UR: ABNORMAL
CO2 SERPL-SCNC: 21.3 MMOL/L (ref 22–29)
COARSE GRAN CASTS URNS QL MICRO: ABNORMAL /LPF
COLOR UR: ABNORMAL
CREAT SERPL-MCNC: 1.2 MG/DL (ref 0.57–1)
DEPRECATED RDW RBC AUTO: 41.2 FL (ref 37–54)
EGFRCR SERPLBLD CKD-EPI 2021: 49.1 ML/MIN/1.73
EOSINOPHIL # BLD AUTO: 0.02 10*3/MM3 (ref 0–0.4)
EOSINOPHIL NFR BLD AUTO: 0.2 % (ref 0.3–6.2)
ERYTHROCYTE [DISTWIDTH] IN BLOOD BY AUTOMATED COUNT: 12.2 % (ref 12.3–15.4)
FLUAV RNA RESP QL NAA+PROBE: NOT DETECTED
FLUBV RNA RESP QL NAA+PROBE: NOT DETECTED
GLOBULIN UR ELPH-MCNC: 3.8 GM/DL
GLUCOSE SERPL-MCNC: 116 MG/DL (ref 65–99)
GLUCOSE UR STRIP-MCNC: NEGATIVE MG/DL
HCT VFR BLD AUTO: 36.8 % (ref 34–46.6)
HGB BLD-MCNC: 13 G/DL (ref 12–15.9)
HGB UR QL STRIP.AUTO: ABNORMAL
HOLD SPECIMEN: NORMAL
HOLD SPECIMEN: NORMAL
HYALINE CASTS UR QL AUTO: ABNORMAL /LPF
IMM GRANULOCYTES # BLD AUTO: 0.03 10*3/MM3 (ref 0–0.05)
IMM GRANULOCYTES NFR BLD AUTO: 0.3 % (ref 0–0.5)
KETONES UR QL STRIP: ABNORMAL
LEUKOCYTE ESTERASE UR QL STRIP.AUTO: ABNORMAL
LIPASE SERPL-CCNC: 41 U/L (ref 13–60)
LYMPHOCYTES # BLD AUTO: 0.97 10*3/MM3 (ref 0.7–3.1)
LYMPHOCYTES NFR BLD AUTO: 8.6 % (ref 19.6–45.3)
MAGNESIUM SERPL-MCNC: 2 MG/DL (ref 1.6–2.4)
MCH RBC QN AUTO: 32.6 PG (ref 26.6–33)
MCHC RBC AUTO-ENTMCNC: 35.3 G/DL (ref 31.5–35.7)
MCV RBC AUTO: 92.2 FL (ref 79–97)
MONOCYTES # BLD AUTO: 1.02 10*3/MM3 (ref 0.1–0.9)
MONOCYTES NFR BLD AUTO: 9 % (ref 5–12)
MUCOUS THREADS URNS QL MICRO: ABNORMAL /HPF
NEUTROPHILS NFR BLD AUTO: 81.7 % (ref 42.7–76)
NEUTROPHILS NFR BLD AUTO: 9.25 10*3/MM3 (ref 1.7–7)
NITRITE UR QL STRIP: NEGATIVE
NRBC BLD AUTO-RTO: 0 /100 WBC (ref 0–0.2)
PH UR STRIP.AUTO: 5.5 [PH] (ref 5–8)
PLATELET # BLD AUTO: 291 10*3/MM3 (ref 140–450)
PMV BLD AUTO: 9.2 FL (ref 6–12)
POTASSIUM SERPL-SCNC: 3.3 MMOL/L (ref 3.5–5.2)
PROT SERPL-MCNC: 8 G/DL (ref 6–8.5)
PROT UR QL STRIP: ABNORMAL
RBC # BLD AUTO: 3.99 10*6/MM3 (ref 3.77–5.28)
RBC # UR STRIP: ABNORMAL /HPF
REF LAB TEST METHOD: ABNORMAL
RENAL EPI CELLS #/AREA URNS HPF: ABNORMAL /HPF
RSV RNA RESP QL NAA+PROBE: NOT DETECTED
S PYO AG THROAT QL: NEGATIVE
SARS-COV-2 RNA RESP QL NAA+PROBE: NOT DETECTED
SODIUM SERPL-SCNC: 135 MMOL/L (ref 136–145)
SP GR UR STRIP: 1.02 (ref 1–1.03)
SQUAMOUS #/AREA URNS HPF: ABNORMAL /HPF
UROBILINOGEN UR QL STRIP: ABNORMAL
WBC # UR STRIP: ABNORMAL /HPF
WBC CLUMPS # UR AUTO: ABNORMAL /HPF
WBC NRBC COR # BLD AUTO: 11.31 10*3/MM3 (ref 3.4–10.8)
WHOLE BLOOD HOLD COAG: NORMAL
WHOLE BLOOD HOLD SPECIMEN: NORMAL

## 2025-04-25 PROCEDURE — 87088 URINE BACTERIA CULTURE: CPT

## 2025-04-25 PROCEDURE — 83690 ASSAY OF LIPASE: CPT

## 2025-04-25 PROCEDURE — 87081 CULTURE SCREEN ONLY: CPT

## 2025-04-25 PROCEDURE — 71045 X-RAY EXAM CHEST 1 VIEW: CPT

## 2025-04-25 PROCEDURE — 87880 STREP A ASSAY W/OPTIC: CPT

## 2025-04-25 PROCEDURE — 80053 COMPREHEN METABOLIC PANEL: CPT

## 2025-04-25 PROCEDURE — 81001 URINALYSIS AUTO W/SCOPE: CPT

## 2025-04-25 PROCEDURE — 25810000003 SODIUM CHLORIDE 0.9 % SOLUTION

## 2025-04-25 PROCEDURE — 85025 COMPLETE CBC W/AUTO DIFF WBC: CPT

## 2025-04-25 PROCEDURE — 87086 URINE CULTURE/COLONY COUNT: CPT

## 2025-04-25 PROCEDURE — 87637 SARSCOV2&INF A&B&RSV AMP PRB: CPT

## 2025-04-25 PROCEDURE — 83735 ASSAY OF MAGNESIUM: CPT

## 2025-04-25 PROCEDURE — 99283 EMERGENCY DEPT VISIT LOW MDM: CPT

## 2025-04-25 PROCEDURE — 87186 SC STD MICRODIL/AGAR DIL: CPT

## 2025-04-25 RX ORDER — SODIUM CHLORIDE 0.9 % (FLUSH) 0.9 %
10 SYRINGE (ML) INJECTION AS NEEDED
Status: DISCONTINUED | OUTPATIENT
Start: 2025-04-25 | End: 2025-04-25 | Stop reason: HOSPADM

## 2025-04-25 RX ORDER — CEPHALEXIN 500 MG/1
500 CAPSULE ORAL 2 TIMES DAILY
Qty: 14 CAPSULE | Refills: 0 | Status: SHIPPED | OUTPATIENT
Start: 2025-04-25 | End: 2025-05-02

## 2025-04-25 RX ORDER — POTASSIUM CHLORIDE 750 MG/1
40 CAPSULE, EXTENDED RELEASE ORAL ONCE
Status: COMPLETED | OUTPATIENT
Start: 2025-04-25 | End: 2025-04-25

## 2025-04-25 RX ADMIN — SODIUM CHLORIDE 1000 ML: 9 INJECTION, SOLUTION INTRAVENOUS at 10:28

## 2025-04-25 RX ADMIN — POTASSIUM CHLORIDE 40 MEQ: 750 CAPSULE, EXTENDED RELEASE ORAL at 10:28

## 2025-04-25 NOTE — PROGRESS NOTES
Chief Complaint  Follow-up (6 month) and Coronary Artery Disease    Subjective        History of Present Illness  Radha Bocanegra presents to [facility] for follow up.   Patient is a 70-year-old female with past medical history outlined below, significant for MI in 2020 status post PCI to the LAD using ANGY, obesity, hypertension, hyperlipidemia who presents for routine follow-up.  She is doing well from a cardiac standpoint.  She has no chest pain or discomfort, dyspnea, palpitations, edema.    Past Medical History:   Diagnosis Date    Ankle sprain     Clotting disorder     On blood thinner    Colon polyp     Found some last time    Dislocation of finger     Fracture of wrist 1967    Fracture, femur 2023    GERD (gastroesophageal reflux disease)     Hyperlipidemia     Hypertension     Knee swelling     Myocardial infarction 20    Osteoporosis     Seasonal allergies     Tear of meniscus of knee     Tennis elbow        ALLERGY  Allergies   Allergen Reactions    Advil [Ibuprofen] Hives        Past Surgical History:   Procedure Laterality Date    CAROTID STENT      COLONOSCOPY      You all done the colonoscopy    COLONOSCOPY N/A 2023    Procedure: COLONOSCOPY WITH POLYPECTOMY;  Surgeon: Sudheer Kenney MD;  Location: [facility];  Service: Gastroenterology;  Laterality: N/A;  COLON POLYP, DIVERTICULOSIS    CORONARY STENT PLACEMENT      HAND SURGERY      HYSTERECTOMY N/A     KNEE SURGERY          Social History     Socioeconomic History    Marital status:    Tobacco Use    Smoking status: Former     Current packs/day: 0.00     Average packs/day: 1.1 packs/day for 23.6 years (26.5 ttl pk-yrs)     Types: Cigarettes     Start date: 1998     Quit date: 2016     Years since quittin.0     Passive exposure: Past    Smokeless tobacco: Never   Vaping Use    Vaping status: Never Used   Substance and Sexual Activity    Alcohol use:  Yes     Alcohol/week: 1.0 standard drink of alcohol     Types: 1 Cans of beer per week     Comment: Once twice month    Drug use: Never    Sexual activity: Yes     Partners: Male     Birth control/protection: None, Hysterectomy       Family History   Problem Relation Age of Onset    Osteoporosis Mother     Cancer Father         Colon cancer    Colon cancer Father          from colon cancer    Heart attack Father     Anesthesia problems Sister         Current Outpatient Medications on File Prior to Visit   Medication Sig    albuterol sulfate  (90 Base) MCG/ACT inhaler Inhale 2 puffs Every 6 (Six) Hours As Needed for Wheezing.    aspirin 81 MG chewable tablet Chew 1 tablet Daily.    buPROPion SR (WELLBUTRIN SR) 100 MG 12 hr tablet     cyclobenzaprine (FLEXERIL) 10 MG tablet     famotidine (PEPCID) 40 MG tablet Take 1 tablet by mouth Daily.    fexofenadine-pseudoephedrine (ALLEGRA-D 24) 180-240 MG per 24 hr tablet Take 1 tablet by mouth Daily.    fluticasone (FLONASE) 50 MCG/ACT nasal spray Administer 2 sprays into the nostril(s) as directed by provider Daily.    hydroCHLOROthiazide (HYDRODIURIL) 25 MG tablet     losartan (COZAAR) 25 MG tablet TAKE 1 TABLET BY MOUTH DAILY.    metoprolol succinate XL (TOPROL-XL) 100 MG 24 hr tablet     pantoprazole (PROTONIX) 20 MG EC tablet Take 1 tablet by mouth Daily.    rosuvastatin (CRESTOR) 20 MG tablet Take 1 tablet by mouth Daily.    SEMAGLUTIDE PO Inject 2.5 mg under the skin into the appropriate area as directed 1 (One) Time Per Week. (Patient taking differently: Inject 1 mg under the skin into the appropriate area as directed 1 (One) Time Per Week.)    spironolactone (ALDACTONE) 25 MG tablet     valACYclovir (VALTREX) 1000 MG tablet     Vitamin D, Cholecalciferol, (CHOLECALCIFEROL) 10 MCG (400 UNIT) tablet Take 1 tablet by mouth Daily.    benzonatate (TESSALON) 100 MG capsule Take 1 capsule by mouth 3 (Three) Times a Day As Needed for Cough. (Patient not  "taking: Reported on 4/25/2025)     No current facility-administered medications on file prior to visit.       Objective   Vitals:    04/25/25 0858   BP: 101/45   BP Location: Left arm   Patient Position: Sitting   Cuff Size: Adult   Pulse: 91   Weight: 86.2 kg (190 lb)   Height: 162.6 cm (64.02\")       Physical Exam  Constitutional:       General: She is awake. She is not in acute distress.     Appearance: Normal appearance.   HENT:      Head: Normocephalic.      Nose: Nose normal. No congestion.   Eyes:      Extraocular Movements: Extraocular movements intact.      Conjunctiva/sclera: Conjunctivae normal.      Pupils: Pupils are equal, round, and reactive to light.   Neck:      Thyroid: No thyromegaly.      Vascular: No JVD.   Cardiovascular:      Rate and Rhythm: Normal rate and regular rhythm.      Chest Wall: PMI is not displaced.      Pulses: Normal pulses.      Heart sounds: Normal heart sounds, S1 normal and S2 normal. No murmur heard.     No friction rub. No gallop. No S3 or S4 sounds.   Pulmonary:      Effort: Pulmonary effort is normal.      Breath sounds: Normal breath sounds. No wheezing, rhonchi or rales.   Abdominal:      General: Bowel sounds are normal.      Palpations: Abdomen is soft.      Tenderness: There is no abdominal tenderness.   Musculoskeletal:      Cervical back: No tenderness.      Right lower leg: No edema.      Left lower leg: No edema.   Lymphadenopathy:      Cervical: No cervical adenopathy.   Skin:     General: Skin is warm and dry.      Capillary Refill: Capillary refill takes less than 2 seconds.      Coloration: Skin is not cyanotic.      Findings: No petechiae or rash.      Nails: There is no clubbing.   Neurological:      Mental Status: She is alert.   Psychiatric:         Mood and Affect: Mood normal.         Behavior: Behavior is cooperative.           Result Review     The following data was reviewed by WEI Reyes on 04/25/25.                   No results found " for this or any previous visit.          Procedures      Assessment & Plan  CAD S/P percutaneous coronary angioplasty  Status post PCI to the LAD.  Clinically stable with no angina or anginal-like symptoms.  Continue baby aspirin and metoprolol.  Essential hypertension  Blood pressure is on the low side today.  She is going to monitor it and let us know if it remains low at home.  Can consider discontinuing spironolactone if blood pressure remains on the low side.  Mixed dyslipidemia  Continue Crestor.  Check a lipid panel prior to next office visit.                     Follow Up   Return in about 6 months (around 10/25/2025) for With Dr. Khan.    Patient was given instructions and counseling regarding her condition or for health maintenance advice. Please see specific information pulled into the AVS if appropriate.     Cindy Hawk, WEI  04/25/25  09:10 EDT    Dictated Utilizing Dragon Dictation

## 2025-04-25 NOTE — ED PROVIDER NOTES
Time: 9:32 AM EDT  Date of encounter:  4/25/2025  Independent Historian/Clinical History and Information was obtained by:   Patient and Family    History is limited by: N/A    Chief Complaint: Nausea      History of Present Illness:  Patient is a 70 y.o. year old female who presents to the emergency department for evaluation of nausea.  Patient states that she woke up on Monday, 4 days ago feeling unwell.  She states she is continue to have nausea since then that comes in waves.  She had vomiting but no diarrhea.  Is admitting to epigastric abdominal pain.  Denies any recent sick contacts or any travel.  Patient does state that she has had a fever at home with highest being 101.  Does improve with medications.  She has not had chest pain, shortness of breath, cough, urinary symptoms, sore throat, ear pain.      Patient Care Team  Primary Care Provider: Camila Esquivel DO    Past Medical History:     Allergies   Allergen Reactions    Advil [Ibuprofen] Hives     Past Medical History:   Diagnosis Date    Ankle sprain 1972    Clotting disorder     On blood thinner    Colon polyp     Found some last time    Dislocation of finger 2012    Fracture of wrist 1967    Fracture, femur 03/23/2023    GERD (gastroesophageal reflux disease)     Hyperlipidemia     Hypertension     Knee swelling     Myocardial infarction 6/17/20    Osteoporosis     Seasonal allergies     Tear of meniscus of knee 2012    Tennis elbow 1990     Past Surgical History:   Procedure Laterality Date    CAROTID STENT  2020    COLONOSCOPY      You all done the colonoscopy    COLONOSCOPY N/A 12/21/2023    Procedure: COLONOSCOPY WITH POLYPECTOMY;  Surgeon: Sudheer Kenney MD;  Location: [facility];  Service: Gastroenterology;  Laterality: N/A;  COLON POLYP, DIVERTICULOSIS    CORONARY STENT PLACEMENT  2020    HAND SURGERY      HYSTERECTOMY N/A     KNEE SURGERY  2012     Family History   Problem Relation Age of Onset    Osteoporosis Mother      Cancer Father         Colon cancer    Colon cancer Father          from colon cancer    Heart attack Father     Anesthesia problems Sister        Home Medications:  Prior to Admission medications    Medication Sig Start Date End Date Taking? Authorizing Provider   albuterol sulfate  (90 Base) MCG/ACT inhaler Inhale 2 puffs Every 6 (Six) Hours As Needed for Wheezing. 24   Morgan Arriola MD   aspirin 81 MG chewable tablet Chew 1 tablet Daily.    Juan Barrett MD   benzonatate (TESSALON) 100 MG capsule Take 1 capsule by mouth 3 (Three) Times a Day As Needed for Cough.  Patient not taking: Reported on 2025   Morgan Arriola MD   buPROPion SR (WELLBUTRIN SR) 100 MG 12 hr tablet  22   Juan Barrett MD   cyclobenzaprine (FLEXERIL) 10 MG tablet  23   Juan Barrett MD   famotidine (PEPCID) 40 MG tablet Take 1 tablet by mouth Daily.    Juan Barrett MD   fexofenadine-pseudoephedrine (ALLEGRA-D 24) 180-240 MG per 24 hr tablet Take 1 tablet by mouth Daily.    Juan Barrett MD   fluticasone (FLONASE) 50 MCG/ACT nasal spray Administer 2 sprays into the nostril(s) as directed by provider Daily. 10/15/24   Juan Barrett MD   hydroCHLOROthiazide (HYDRODIURIL) 25 MG tablet  22   Juan Barrett MD   losartan (COZAAR) 25 MG tablet TAKE 1 TABLET BY MOUTH DAILY. 25   Cindy Hawk APRN   metoprolol succinate XL (TOPROL-XL) 100 MG 24 hr tablet  1/10/23   Juan Barrett MD   pantoprazole (PROTONIX) 20 MG EC tablet Take 1 tablet by mouth Daily.    Juan Barrett MD   rosuvastatin (CRESTOR) 20 MG tablet Take 1 tablet by mouth Daily. 24   Yadira Owen MD   SEMAGLUTIDE PO Inject 2.5 mg under the skin into the appropriate area as directed 1 (One) Time Per Week.  Patient taking differently: Inject 1 mg under the skin into the appropriate area as directed 1 (One) Time Per Week. 24   " Juan Barrett MD   spironolactone (ALDACTONE) 25 MG tablet  22   Juan Barrett MD   valACYclovir (VALTREX) 1000 MG tablet  10/9/23   Juan Barrett MD   Vitamin D, Cholecalciferol, (CHOLECALCIFEROL) 10 MCG (400 UNIT) tablet Take 1 tablet by mouth Daily.    Juan Barrett MD        Social History:   Social History     Tobacco Use    Smoking status: Former     Current packs/day: 0.00     Average packs/day: 1.1 packs/day for 23.6 years (26.5 ttl pk-yrs)     Types: Cigarettes     Start date: 1998     Quit date: 2016     Years since quittin.0     Passive exposure: Past    Smokeless tobacco: Never   Vaping Use    Vaping status: Never Used   Substance Use Topics    Alcohol use: Yes     Alcohol/week: 1.0 standard drink of alcohol     Types: 1 Cans of beer per week     Comment: Once twice month    Drug use: Never         Review of Systems:  Review of Systems   Constitutional:  Positive for fever. Negative for chills.   HENT:  Negative for ear pain and sore throat.    Respiratory:  Negative for cough and shortness of breath.    Cardiovascular:  Negative for chest pain.   Gastrointestinal:  Positive for abdominal pain and nausea. Negative for diarrhea and vomiting.   Genitourinary:  Negative for dysuria and frequency.   Musculoskeletal:  Negative for myalgias.        Physical Exam:  /78 (BP Location: Right arm, Patient Position: Lying)   Pulse 82   Temp 99 °F (37.2 °C) (Oral)   Resp 18   Ht 162.6 cm (64.02\")   Wt 86.2 kg (190 lb)   SpO2 100%   BMI 32.60 kg/m²     Physical Exam  Vitals and nursing note reviewed.   Constitutional:       General: She is not in acute distress.     Appearance: Normal appearance. She is not ill-appearing, toxic-appearing or diaphoretic.   HENT:      Head: Normocephalic and atraumatic.      Right Ear: Tympanic membrane, ear canal and external ear normal.      Left Ear: Tympanic membrane, ear canal and external ear normal.      Nose: Nose " normal.      Mouth/Throat:      Mouth: Mucous membranes are moist.      Pharynx: Oropharynx is clear. No oropharyngeal exudate or posterior oropharyngeal erythema.   Eyes:      Extraocular Movements: Extraocular movements intact.      Conjunctiva/sclera: Conjunctivae normal.   Cardiovascular:      Rate and Rhythm: Normal rate and regular rhythm.      Heart sounds: Normal heart sounds.   Pulmonary:      Effort: Pulmonary effort is normal.      Breath sounds: Normal breath sounds.   Abdominal:      General: Abdomen is flat. Bowel sounds are normal. There is no distension.      Palpations: Abdomen is soft. There is no mass.      Tenderness: There is abdominal tenderness in the right upper quadrant, epigastric area and left upper quadrant. There is no guarding or rebound.      Hernia: No hernia is present.   Musculoskeletal:         General: Normal range of motion.      Cervical back: Normal range of motion and neck supple.   Skin:     General: Skin is warm and dry.   Neurological:      General: No focal deficit present.      Mental Status: She is alert and oriented to person, place, and time.   Psychiatric:         Mood and Affect: Mood normal.         Behavior: Behavior normal.         Thought Content: Thought content normal.         Judgment: Judgment normal.                 Medical Decision Making:    Comorbidities that affect care:    Hypertension, GERD, CAD    External Notes reviewed:    Previous Clinic Note: Patient last seen by cardiology today      The following orders were placed and all results were independently analyzed by me:  Orders Placed This Encounter   Procedures    Rapid Strep A Screen - Swab, Throat    COVID-19, FLU A/B, RSV PCR 1 HR TAT - Swab, Nasopharynx    Beta Strep Culture, Throat - Swab, Throat    Urine Culture - Urine,    XR Chest 1 View    [place] Draw    Comprehensive Metabolic Panel    Lipase    Urinalysis With Microscopic If Indicated (No Culture) - Urine, Clean Catch    CBC Auto  Differential    Urinalysis, Microscopic Only - Urine, Clean Catch    Magnesium    Insert Peripheral IV    CBC & Differential    Green Top (Gel)    Lavender Top    Gold Top - SST    Light Blue Top       Medications Given in the Emergency Department:  Medications   sodium chloride 0.9 % flush 10 mL (has no administration in time range)   sodium chloride 0.9 % bolus 1,000 mL (1,000 mL Intravenous New Bag 4/25/25 1028)   potassium chloride (MICRO-K/KLOR-CON) CR capsule (40 mEq Oral Given 4/25/25 1028)        ED Course:         Labs:    Lab Results (last 24 hours)       Procedure Component Value Units Date/Time    Urinalysis With Microscopic If Indicated (No Culture) - Urine, Clean Catch [[id number]]  (Abnormal) Collected: 04/25/25 0943    Specimen: Urine, Clean Catch Updated: 04/25/25 1002     Color, UA Dark Yellow     Appearance, UA Turbid     pH, UA 5.5     Specific Gravity, UA 1.018     Glucose, UA Negative     Ketones, UA Trace     Bilirubin, UA Small (1+)     Blood, UA Small (1+)     Protein,  mg/dL (2+)     Leuk Esterase, UA Large (3+)     Nitrite, UA Negative     Urobilinogen, UA 1.0 E.U./dL    Rapid Strep A Screen - Swab, Throat [[id number]]  (Normal) Collected: 04/25/25 0943    Specimen: Swab from Throat Updated: 04/25/25 1027     Strep A Ag Negative    COVID-19, FLU A/B, RSV PCR 1 HR TAT - Swab, Nasopharynx [[id number]]  (Normal) Collected: 04/25/25 0943    Specimen: Swab from Nasopharynx Updated: 04/25/25 1054     COVID19 Not Detected     Influenza A PCR Not Detected     Influenza B PCR Not Detected     RSV, PCR Not Detected    Narrative:      Fact sheet for providers: https://www.fda.gov/media/075129/download    Fact sheet for patients: https://www.fda.gov/media/129056/download    Test performed by PCR.    Urinalysis, Microscopic Only - Urine, Clean Catch [[id number]]  (Abnormal) Collected: 04/25/25 0943    Specimen: Urine, Clean Catch Updated: 04/25/25 1025     RBC, UA None Seen /HPF      WBC, UA 21-50  /HPF      Bacteria, UA 2+ /HPF      Squamous Epithelial Cells, UA 13-20 /HPF      Renal Epithelial Cells, UA 0-2 /HPF      Hyaline Casts, UA None Seen /LPF      Coarse Granular Casts, UA 0-2 /LPF      Mucus, UA Trace /HPF      WBC Clumps, UA Small/1+ /HPF      Methodology Manual Light Microscopy    Beta Strep Culture, Throat - Swab, Throat [[id number]] Collected: 04/25/25 0943    Specimen: Swab from Throat Updated: 04/25/25 1027    Urine Culture - Urine, Urine, Clean Catch [[id number]] Collected: 04/25/25 0943    Specimen: Urine, Clean Catch Updated: 04/25/25 1033    CBC & Differential [[id number]]  (Abnormal) Collected: 04/25/25 0946    Specimen: Blood from Arm, Right Updated: 04/25/25 0953    Narrative:      The following orders were created for panel order CBC & Differential.  Procedure                               Abnormality         Status                     ---------                               -----------         ------                     CBC Auto Differential[[id number]]        Abnormal            Final result                 Please view results for these tests on the individual orders.    Comprehensive Metabolic Panel [[id number]]  (Abnormal) Collected: 04/25/25 0946    Specimen: Blood from Arm, Right Updated: 04/25/25 1009     Glucose 116 mg/dL      BUN 13 mg/dL      Creatinine 1.20 mg/dL      Sodium 135 mmol/L      Potassium 3.3 mmol/L      Chloride 96 mmol/L      CO2 21.3 mmol/L      Calcium 8.7 mg/dL      Total Protein 8.0 g/dL      Albumin 4.2 g/dL      ALT (SGPT) 33 U/L      AST (SGOT) 38 U/L      Alkaline Phosphatase 182 U/L      Total Bilirubin 0.7 mg/dL      Globulin 3.8 gm/dL      A/G Ratio 1.1 g/dL      BUN/Creatinine Ratio 10.8     Anion Gap 17.7 mmol/L      eGFR 49.1 mL/min/1.73     Narrative:      GFR Categories in Chronic Kidney Disease (CKD)      GFR Category          GFR (mL/min/1.73)    Interpretation  G1                     90 or greater         Normal or high (1)  G2                       60-89                Mild decrease (1)  G3a                   45-59                Mild to moderate decrease  G3b                   30-44                Moderate to severe decrease  G4                    15-29                Severe decrease  G5                    14 or less           Kidney failure          (1)In the absence of evidence of kidney disease, neither GFR category G1 or G2 fulfill the criteria for CKD.    eGFR calculation 2021 CKD-EPI creatinine equation, which does not include race as a factor    Lipase [[id number]]  (Normal) Collected: 04/25/25 0946    Specimen: Blood from Arm, Right Updated: 04/25/25 1009     Lipase 41 U/L     CBC Auto Differential [[id number]]  (Abnormal) Collected: 04/25/25 0946    Specimen: Blood from Arm, Right Updated: 04/25/25 0953     WBC 11.31 10*3/mm3      RBC 3.99 10*6/mm3      Hemoglobin 13.0 g/dL      Hematocrit 36.8 %      MCV 92.2 fL      MCH 32.6 pg      MCHC 35.3 g/dL      RDW 12.2 %      RDW-SD 41.2 fl      MPV 9.2 fL      Platelets 291 10*3/mm3      Neutrophil % 81.7 %      Lymphocyte % 8.6 %      Monocyte % 9.0 %      Eosinophil % 0.2 %      Basophil % 0.2 %      Immature Grans % 0.3 %      Neutrophils, Absolute 9.25 10*3/mm3      Lymphocytes, Absolute 0.97 10*3/mm3      Monocytes, Absolute 1.02 10*3/mm3      Eosinophils, Absolute 0.02 10*3/mm3      Basophils, Absolute 0.02 10*3/mm3      Immature Grans, Absolute 0.03 10*3/mm3      nRBC 0.0 /100 WBC     Magnesium [[id number]]  (Normal) Collected: 04/25/25 0946    Specimen: Blood from Arm, Right Updated: 04/25/25 1034     Magnesium 2.0 mg/dL              Imaging:    XR Chest 1 View  Result Date: 4/25/2025  XR CHEST 1 VW Date of Exam: 4/25/2025 10:21 AM EDT Indication: fever Comparison: Two-view chest x-ray 4/11/2024, AP chest x-ray 11/10/2022 Findings: Lungs are well expanded and appear clear. No consolidation or large pleural effusion is seen. Cardiomediastinal contours appear stable.     Impression: No acute  cardiopulmonary abnormality is identified. Electronically Signed: Aranza Pierre MD  4/25/2025 10:24 AM EDT  Workstation ID: [id number]        Differential Diagnosis and Discussion:    Vomiting: Differential diagnosis includes but is not limited to migraine, labyrinthine disorders, psychogenic, metabolic and endocrine causes, peptic ulcer, gastric outlet obstruction, gastritis, gastroenteritis, appendicitis, intestinal obstruction, paralytic ileus, food poisoning, cholecystitis, acute hepatitis, acute pancreatitis, acute febrile illness, and myocardial infarction.    PROCEDURES:    Labs were collected in the emergency department and all labs were reviewed and interpreted by me.  X-ray were performed in the emergency department and all X-ray impressions were independently interpreted by me.    No orders to display       Procedures    MDM  Number of Diagnoses or Management Options  Acute UTI  Hypokalemia  Mild dehydration  Nausea without vomiting  Diagnosis management comments: Patient presented to the emergency department today for evaluation of nausea and feeling unwell.  CBC notes a white blood cell count of 11.31 with normal hemoglobin hematocrit.  CMP notes glucose 116, creatinine 1.20, sodium 135, potassium 3.3, AST of 38, alk phos 182, anion gap 17.7 and GFR 49.1.  Patient has not been eating and drinking well due to her nausea and this is consistent with dehydration.  Patient was given 1 L fluids in the emergency department along with 40 mg of p.o. potassium.  Lipase normal.  Magnesium unremarkable.  Rapid influenza, COVID, RSV testing is negative.  Strep testing is negative.  Urinalysis positive for acute UTI and again notes protein with bilirubin and small amount of blood concerning for dehydration as well.  Chest x-ray was completed due to patient's fever and is unremarkable.  Will begin patient on Keflex outpatient pending urine culture.  Offered patient Zofran however she declined.  Return to the emergency  department guidelines provided.       Amount and/or Complexity of Data Reviewed  Clinical lab tests: reviewed and ordered  Tests in the radiology section of CPT®: reviewed and ordered  Decide to obtain previous medical records or to obtain history from someone other than the patient: yes    Risk of Complications, Morbidity, and/or Mortality  Presenting problems: moderate  Diagnostic procedures: low  Management options: low    Patient Progress  Patient progress: stable       Patient Care Considerations:    SEPSIS was considered but is NOT present in the emergency department as SIRS criteria is not present.      Consultants/Shared Management Plan:    None    Social Determinants of Health:    Patient is independent, reliable, and has access to care.       Disposition and Care Coordination:    Discharged: The patient is suitable and stable for discharge with no need for consideration of admission.    I have explained the patient´s condition, diagnoses and treatment plan based on the information available to me at this time. I have answered questions and addressed any concerns. The patient has a good  understanding of the patient´s diagnosis, condition, and treatment plan as can be expected at this point. The vital signs have been stable. The patient´s condition is stable and appropriate for discharge from the emergency department.      The patient will pursue further outpatient evaluation with the primary care physician or other designated or consulting physician as outlined in the discharge instructions. They are agreeable to this plan of care and follow-up instructions have been explained in detail. The patient has received these instructions in written format and has expressed an understanding of the discharge instructions. The patient is aware that any significant change in condition or worsening of symptoms should prompt an immediate return to this or the closest emergency department or call to 911.  I have  explained discharge medications and the need for follow up with the patient/caretakers. This was also printed in the discharge instructions. Patient was discharged with the following medications and follow up:      Medication List        New Prescriptions      cephalexin 500 MG capsule  Commonly known as: KEFLEX  Take 1 capsule by mouth 2 (Two) Times a Day for 7 days.            Changed      SEMAGLUTIDE PO  What changed: how much to take               Where to Get Your Medications        These medications were sent to [facility] - [address] [phone] [facility] [phone] [address], [facility] [address]      Phone: [phone]   cephalexin 500 MG capsule      Camila Esquivel DO  [address] [place], [place] [id number] [phone]             Final diagnoses:   Acute UTI   Mild dehydration   Nausea without vomiting   Hypokalemia        ED Disposition       ED Disposition   Discharge    Condition   Stable    Comment   --               This medical record created using voice recognition software.             Phi Fofana PA-C  04/25/25 4504

## 2025-04-25 NOTE — DISCHARGE INSTRUCTIONS
Take full course of antibiotics as directed for treatment of UTI.  We are culturing your urine to ensure that we are putting you on the proper antibiotic, if there is need for antibiotic change we will contact you in the next 3 days.  You are dehydrated and your potassium was mildly low, you were given fluids and potassium in the emergency department to supplement this.  Return to the emergency department for new or worsening symptoms.

## 2025-04-27 LAB
BACTERIA SPEC AEROBE CULT: ABNORMAL
BACTERIA SPEC AEROBE CULT: NORMAL

## 2025-04-30 RX ORDER — ROSUVASTATIN CALCIUM 20 MG/1
20 TABLET, COATED ORAL DAILY
Qty: 30 TABLET | Refills: 0 | Status: SHIPPED | OUTPATIENT
Start: 2025-04-30

## 2025-05-21 ENCOUNTER — TELEPHONE (OUTPATIENT)
Dept: ORTHOPEDIC SURGERY | Facility: CLINIC | Age: 70
End: 2025-05-21
Payer: MEDICARE

## 2025-05-28 ENCOUNTER — OFFICE VISIT (OUTPATIENT)
Dept: ORTHOPEDIC SURGERY | Facility: CLINIC | Age: 70
End: 2025-05-28
Payer: MEDICARE

## 2025-05-28 VITALS
BODY MASS INDEX: 32.44 KG/M2 | HEIGHT: 64 IN | DIASTOLIC BLOOD PRESSURE: 103 MMHG | SYSTOLIC BLOOD PRESSURE: 161 MMHG | WEIGHT: 190 LBS | HEART RATE: 68 BPM | OXYGEN SATURATION: 95 %

## 2025-05-28 DIAGNOSIS — M79.644 FINGER PAIN, RIGHT: Primary | ICD-10-CM

## 2025-05-28 DIAGNOSIS — S62.642A CLOSED NONDISPLACED FRACTURE OF PROXIMAL PHALANX OF RIGHT MIDDLE FINGER, INITIAL ENCOUNTER: ICD-10-CM

## 2025-05-28 NOTE — PROGRESS NOTES
"Chief Complaint  Initial Evaluation of the Right Hand (3rd Finger)     Subjective      Radha Bocanegra presents to [facility] for initial evaluation of the right hand 3 rd digit. Due to the patients high blood pressure reading today, I advised the patient to contact their PCP.  She went to  on 25 and had X rays and placed in a splint.  States that 25 she fell off of her lawnmower and injured her right hand.     Allergies   Allergen Reactions    Advil [Ibuprofen] Hives        Social History     Socioeconomic History    Marital status:    Tobacco Use    Smoking status: Former     Current packs/day: 0.00     Average packs/day: 1.1 packs/day for 23.6 years (26.5 ttl pk-yrs)     Types: Cigarettes     Start date: 1998     Quit date: 2016     Years since quittin.1     Passive exposure: Past    Smokeless tobacco: Never   Vaping Use    Vaping status: Never Used   Substance and Sexual Activity    Alcohol use: Yes     Alcohol/week: 1.0 standard drink of alcohol     Types: 1 Cans of beer per week     Comment: Once twice month    Drug use: Never    Sexual activity: Yes     Partners: Male     Birth control/protection: None, Hysterectomy        I reviewed the patient's chief complaint, history of present illness, review of systems, past medical history, surgical history, family history, social history, medications, and allergy list.     Review of Systems     Constitutional: Denies fevers, chills, weight loss  Cardiovascular: Denies chest pain, shortness of breath  Skin: Denies rashes, acute skin changes  Neurologic: Denies headache, loss of consciousness        Vital Signs:   BP (!) 161/103 Comment: Patient instructed to see PCP  Pulse 68   Ht 162.6 cm (64\")   Wt 86.2 kg (190 lb)   SpO2 95%   BMI 32.61 kg/m²          Physical Exam  General: Alert. No acute distress    Ortho Exam        RIGHT HAND Mild swelling and pain.  Sensation grossly intact to light touch, median, " radial and ulnar nerve. Positive AIN, PIN and ulnar nerve motor function intact. Axillary nerve intact. Positive pulses. Mildly Full , thumb opposition, MCP flexors, DIP flexors and PIP flexors.        Orthopedic Injury Treatment    Date/Time: 5/28/2025 11:06 AM    Performed by: Malika Hurd MA  Authorized by: Timo Gamez MD  Injury location: finger  Location details: right long finger    Anesthesia:  Local anesthesia used: no    Sedation:  Patient sedated: no    Immobilization: splint  Post-procedure neurovascular assessment: post-procedure neurovascularly intact  Patient tolerance: patient tolerated the procedure well with no immediate complications  Comments: César Straps            Imaging Results (Most Recent)       Procedure Component Value Units Date/Time    XR Finger 2+ View Right [[id number]] Resulted: 05/28/25 1031     Updated: 05/28/25 1038             Result Review :     X-Ray Report:  Right Finger  X-Ray  Indication: Evaluation of the right finger  AP/Lateral view(s)  Findings: P1 fracture on the radial aspect .    Prior studies available for comparison: no       XR Hand 3+ View Right  Result Date: 5/20/2025  Narrative: XR HAND 3+ VW RIGHT Date of Exam: 5/20/2025 11:21 AM EDT Indication: right hand and middle finger pain, swelling, and erythema after falling off of lawnmower yesterday; does have hx of fx and sx to right hand many years ago; is right hand dominant Comparison: None available. Findings: 3 views of the right hand were obtained. The bones are osteopenic. There is a nondisplaced fracture at the base of the third finger proximal phalanx with intra-articular extension. There are postsurgical changes of the third metacarpal. Degenerative changes are seen throughout the interphalangeal joints. The soft tissues are unremarkable.     Impression: Impression: Nondisplaced intra-articular fracture at the base of the third finger proximal phalanx. Electronically Signed: Krystal Mcdermott MD   5/20/2025 11:59 AM EDT  Workstation ID: [id number]             Assessment and Plan     Diagnoses and all orders for this visit:    1. Finger pain, right (Primary)  -     XR Finger 2+ View Right  -     Orthopedic Injury Treatment    2. Closed nondisplaced fracture of proximal phalanx of right middle finger, initial encounter        Discussed the treatment plan with the patient. I reviewed the X-rays that were obtained today with the patient.     César straps issued.      If you have all your motion in 4 weeks she doesn't have to come back.  If not she can call or come back in to discuss further imaging and possible physical therapy.       Call or return if worsening symptoms.    Follow Up     PRN      Patient was given instructions and counseling regarding her condition or for health maintenance advice. Please see specific information pulled into the AVS if appropriate.     Scribed for Timo Gamez MD by Fiorella Dias MA.  05/28/25   10:44 EDT      I have personally performed the services described in this document as scribed by the above individual and it is both accurate and complete. Timo Gamez MD 05/28/25

## 2025-06-02 RX ORDER — ROSUVASTATIN CALCIUM 20 MG/1
20 TABLET, COATED ORAL DAILY
Qty: 30 TABLET | Refills: 0 | Status: SHIPPED | OUTPATIENT
Start: 2025-06-02

## 2025-06-09 ENCOUNTER — HOSPITAL ENCOUNTER (EMERGENCY)
Facility: HOSPITAL | Age: 70
Discharge: HOME | End: 2025-06-09
Attending: EMERGENCY MEDICINE | Admitting: EMERGENCY MEDICINE
Payer: MEDICARE

## 2025-06-09 ENCOUNTER — APPOINTMENT (OUTPATIENT)
Dept: GENERAL RADIOLOGY | Facility: HOSPITAL | Age: 70
End: 2025-06-09
Payer: MEDICARE

## 2025-06-09 VITALS
HEIGHT: 64 IN | HEART RATE: 73 BPM | WEIGHT: 191.36 LBS | TEMPERATURE: 97.6 F | OXYGEN SATURATION: 96 % | RESPIRATION RATE: 18 BRPM | BODY MASS INDEX: 32.67 KG/M2 | SYSTOLIC BLOOD PRESSURE: 130 MMHG | DIASTOLIC BLOOD PRESSURE: 87 MMHG

## 2025-06-09 DIAGNOSIS — R07.9 CHEST PAIN, UNSPECIFIED TYPE: Primary | ICD-10-CM

## 2025-06-09 LAB
ALBUMIN SERPL-MCNC: 4.3 G/DL (ref 3.5–5.2)
ALBUMIN/GLOB SERPL: 1.5 G/DL
ALP SERPL-CCNC: 78 U/L (ref 39–117)
ALT SERPL W P-5'-P-CCNC: 12 U/L (ref 1–33)
ANION GAP SERPL CALCULATED.3IONS-SCNC: 10.9 MMOL/L (ref 5–15)
AST SERPL-CCNC: 13 U/L (ref 1–32)
BASOPHILS # BLD AUTO: 0.02 10*3/MM3 (ref 0–0.2)
BASOPHILS NFR BLD AUTO: 0.4 % (ref 0–1.5)
BILIRUB SERPL-MCNC: 0.4 MG/DL (ref 0–1.2)
BUN SERPL-MCNC: 12.3 MG/DL (ref 8–23)
BUN/CREAT SERPL: 10.6 (ref 7–25)
CALCIUM SPEC-SCNC: 9.3 MG/DL (ref 8.6–10.5)
CHLORIDE SERPL-SCNC: 108 MMOL/L (ref 98–107)
CO2 SERPL-SCNC: 20.1 MMOL/L (ref 22–29)
CREAT SERPL-MCNC: 1.16 MG/DL (ref 0.57–1)
DEPRECATED RDW RBC AUTO: 46.2 FL (ref 37–54)
EGFRCR SERPLBLD CKD-EPI 2021: 51.1 ML/MIN/1.73
EOSINOPHIL # BLD AUTO: 0.2 10*3/MM3 (ref 0–0.4)
EOSINOPHIL NFR BLD AUTO: 3.7 % (ref 0.3–6.2)
ERYTHROCYTE [DISTWIDTH] IN BLOOD BY AUTOMATED COUNT: 13 % (ref 12.3–15.4)
GEN 5 1HR TROPONIN T REFLEX: <6 NG/L
GLOBULIN UR ELPH-MCNC: 2.9 GM/DL
GLUCOSE SERPL-MCNC: 80 MG/DL (ref 65–99)
HCT VFR BLD AUTO: 39.3 % (ref 34–46.6)
HGB BLD-MCNC: 12.9 G/DL (ref 12–15.9)
HOLD SPECIMEN: NORMAL
HOLD SPECIMEN: NORMAL
IMM GRANULOCYTES # BLD AUTO: 0.01 10*3/MM3 (ref 0–0.05)
IMM GRANULOCYTES NFR BLD AUTO: 0.2 % (ref 0–0.5)
LIPASE SERPL-CCNC: 55 U/L (ref 13–60)
LYMPHOCYTES # BLD AUTO: 1.71 10*3/MM3 (ref 0.7–3.1)
LYMPHOCYTES NFR BLD AUTO: 31.7 % (ref 19.6–45.3)
MAGNESIUM SERPL-MCNC: 2.2 MG/DL (ref 1.6–2.4)
MCH RBC QN AUTO: 31.7 PG (ref 26.6–33)
MCHC RBC AUTO-ENTMCNC: 32.8 G/DL (ref 31.5–35.7)
MCV RBC AUTO: 96.6 FL (ref 79–97)
MONOCYTES # BLD AUTO: 0.39 10*3/MM3 (ref 0.1–0.9)
MONOCYTES NFR BLD AUTO: 7.2 % (ref 5–12)
NEUTROPHILS NFR BLD AUTO: 3.06 10*3/MM3 (ref 1.7–7)
NEUTROPHILS NFR BLD AUTO: 56.8 % (ref 42.7–76)
NRBC BLD AUTO-RTO: 0 /100 WBC (ref 0–0.2)
NT-PROBNP SERPL-MCNC: 56.9 PG/ML (ref 0–900)
PLATELET # BLD AUTO: 304 10*3/MM3 (ref 140–450)
PMV BLD AUTO: 9.5 FL (ref 6–12)
POTASSIUM SERPL-SCNC: 4.1 MMOL/L (ref 3.5–5.2)
PROT SERPL-MCNC: 7.2 G/DL (ref 6–8.5)
QT INTERVAL: 384 MS
QTC INTERVAL: 430 MS
RBC # BLD AUTO: 4.07 10*6/MM3 (ref 3.77–5.28)
SODIUM SERPL-SCNC: 139 MMOL/L (ref 136–145)
TROPONIN T NUMERIC DELTA: NORMAL
TROPONIN T SERPL HS-MCNC: <6 NG/L
WBC NRBC COR # BLD AUTO: 5.39 10*3/MM3 (ref 3.4–10.8)
WHOLE BLOOD HOLD COAG: NORMAL
WHOLE BLOOD HOLD SPECIMEN: NORMAL

## 2025-06-09 PROCEDURE — 85025 COMPLETE CBC W/AUTO DIFF WBC: CPT | Performed by: EMERGENCY MEDICINE

## 2025-06-09 PROCEDURE — 93005 ELECTROCARDIOGRAM TRACING: CPT

## 2025-06-09 PROCEDURE — 80053 COMPREHEN METABOLIC PANEL: CPT | Performed by: EMERGENCY MEDICINE

## 2025-06-09 PROCEDURE — 71045 X-RAY EXAM CHEST 1 VIEW: CPT

## 2025-06-09 PROCEDURE — 84484 ASSAY OF TROPONIN QUANT: CPT | Performed by: EMERGENCY MEDICINE

## 2025-06-09 PROCEDURE — 83880 ASSAY OF NATRIURETIC PEPTIDE: CPT | Performed by: EMERGENCY MEDICINE

## 2025-06-09 PROCEDURE — 99284 EMERGENCY DEPT VISIT MOD MDM: CPT

## 2025-06-09 PROCEDURE — 36415 COLL VENOUS BLD VENIPUNCTURE: CPT

## 2025-06-09 PROCEDURE — 83735 ASSAY OF MAGNESIUM: CPT | Performed by: EMERGENCY MEDICINE

## 2025-06-09 PROCEDURE — 83690 ASSAY OF LIPASE: CPT | Performed by: EMERGENCY MEDICINE

## 2025-06-09 PROCEDURE — 93005 ELECTROCARDIOGRAM TRACING: CPT | Performed by: EMERGENCY MEDICINE

## 2025-06-09 RX ORDER — SODIUM CHLORIDE 0.9 % (FLUSH) 0.9 %
10 SYRINGE (ML) INJECTION AS NEEDED
Status: DISCONTINUED | OUTPATIENT
Start: 2025-06-09 | End: 2025-06-09 | Stop reason: HOSPADM

## 2025-06-09 NOTE — ED PROVIDER NOTES
Time: 2:02 PM EDT  Date of encounter:  2025  Independent Historian/Clinical History and Information was obtained by:   Patient    History is limited by: N/A    Chief Complaint: chest pain       History of Present Illness:  Patient is a 70 y.o. year old female who presents to the emergency department for evaluation of nonradiating central chest pain/pressure that began today.  Patient states it has since resolved.  Patient denies shortness of breath, leg swelling, cough, fever.  Patient states she had an MI several years ago with stent placement.  Patient states her cardiologist is       Patient Care Team  Primary Care Provider: Camila Esquivel DO    Past Medical History:     Allergies   Allergen Reactions    Advil [Ibuprofen] Hives     Past Medical History:   Diagnosis Date    Ankle sprain     Clotting disorder     On blood thinner    Colon polyp     Found some last time    Dislocation of finger     Fracture of wrist 1967    Fracture, femur 2023    GERD (gastroesophageal reflux disease)     Hyperlipidemia     Hypertension     Knee swelling     Myocardial infarction 20    Osteoporosis     Seasonal allergies     Tear of meniscus of knee     Tennis elbow      Past Surgical History:   Procedure Laterality Date    CAROTID STENT      COLONOSCOPY      You all done the colonoscopy    COLONOSCOPY N/A 2023    Procedure: COLONOSCOPY WITH POLYPECTOMY;  Surgeon: Sudheer Kenney MD;  Location: [facility] ENDOSCOPY;  Service: Gastroenterology;  Laterality: N/A;  COLON POLYP, DIVERTICULOSIS    CORONARY STENT PLACEMENT      HAND SURGERY      HYSTERECTOMY N/A     KNEE SURGERY       Family History   Problem Relation Age of Onset    Osteoporosis Mother     Cancer Father         Colon cancer    Colon cancer Father          from colon cancer    Heart attack Father     Anesthesia problems Sister        Home Medications:  Prior to Admission medications    Medication  Sig Start Date End Date Taking? Authorizing Provider   aspirin 81 MG chewable tablet Chew 1 tablet Daily.   Yes Juan Barrett MD   buPROPion SR (WELLBUTRIN SR) 100 MG 12 hr tablet  12/19/22  Yes Juan Barrett MD   famotidine (PEPCID) 40 MG tablet Take 1 tablet by mouth Daily.   Yes Juan Barrett MD   fexofenadine-pseudoephedrine (ALLEGRA-D 24) 180-240 MG per 24 hr tablet Take 1 tablet by mouth Daily.   Yes Juan Barrett MD   fluticasone (FLONASE) 50 MCG/ACT nasal spray Administer 2 sprays into the nostril(s) as directed by provider Daily. 10/15/24  Yes Juan Barrett MD   losartan (COZAAR) 25 MG tablet TAKE 1 TABLET BY MOUTH DAILY. 1/31/25  Yes Cindy Hawk APRN   metoprolol succinate XL (TOPROL-XL) 100 MG 24 hr tablet  1/10/23  Yes Juan Barrett MD   pantoprazole (PROTONIX) 20 MG EC tablet Take 1 tablet by mouth Daily.   Yes Juan Barrett MD   rosuvastatin (CRESTOR) 20 MG tablet TAKE 1 TABLET BY MOUTH DAILY. 6/2/25  Yes Cindy Hawk APRN   SEMAGLUTIDE PO Inject 2.5 mg under the skin into the appropriate area as directed 1 (One) Time Per Week.  Patient taking differently: Inject 1 mg under the skin into the appropriate area as directed 1 (One) Time Per Week. 7/12/24  Yes Juan Barrett MD   spironolactone (ALDACTONE) 25 MG tablet  12/12/22  Yes Juan Barrett MD   topiramate (TOPAMAX) 50 MG tablet  5/9/25  Yes ProviderJuan MD   valACYclovir (VALTREX) 1000 MG tablet  10/9/23  Yes ProviderJuan MD   Vitamin D, Cholecalciferol, (CHOLECALCIFEROL) 10 MCG (400 UNIT) tablet Take 1 tablet by mouth Daily.   Yes Juan Barrett MD   albuterol sulfate  (90 Base) MCG/ACT inhaler Inhale 2 puffs Every 6 (Six) Hours As Needed for Wheezing. 4/11/24   Morgan Arriola MD   cyclobenzaprine (FLEXERIL) 10 MG tablet  1/6/23   Juan Barrett MD   hydroCHLOROthiazide (HYDRODIURIL) 25 MG tablet   "22   Provider, MD Juan        Social History:   Social History     Tobacco Use    Smoking status: Former     Current packs/day: 0.00     Average packs/day: 1.1 packs/day for 23.6 years (26.5 ttl pk-yrs)     Types: Cigarettes     Start date: 1998     Quit date: 2016     Years since quittin.1     Passive exposure: Past    Smokeless tobacco: Never   Vaping Use    Vaping status: Never Used   Substance Use Topics    Alcohol use: Yes     Alcohol/week: 1.0 standard drink of alcohol     Types: 1 Cans of beer per week     Comment: Once twice month    Drug use: Never         Review of Systems:  Review of Systems   Constitutional:  Negative for chills and fever.   HENT:  Negative for congestion, rhinorrhea and sore throat.    Eyes:  Negative for pain and visual disturbance.   Respiratory:  Negative for apnea, cough, chest tightness and shortness of breath.    Cardiovascular:  Positive for chest pain. Negative for palpitations.   Gastrointestinal:  Negative for abdominal pain, diarrhea, nausea and vomiting.   Genitourinary:  Negative for difficulty urinating and dysuria.   Musculoskeletal:  Negative for joint swelling and myalgias.   Skin:  Negative for color change.   Neurological:  Negative for seizures and headaches.   Psychiatric/Behavioral: Negative.     All other systems reviewed and are negative.       Physical Exam:  /85   Pulse 77   Temp 97.6 °F (36.4 °C) (Oral)   Resp 16   Ht 162.6 cm (64\")   Wt 86.8 kg (191 lb 5.8 oz)   SpO2 100%   BMI 32.85 kg/m²     Physical Exam  Vitals and nursing note reviewed.   Constitutional:       General: She is not in acute distress.     Appearance: Normal appearance. She is not toxic-appearing.   HENT:      Head: Normocephalic and atraumatic.      Jaw: There is normal jaw occlusion.   Eyes:      General: Lids are normal.      Extraocular Movements: Extraocular movements intact.      Conjunctiva/sclera: Conjunctivae normal.      Pupils: Pupils are " equal, round, and reactive to light.   Cardiovascular:      Rate and Rhythm: Normal rate and regular rhythm.      Pulses: Normal pulses.      Heart sounds: Normal heart sounds.   Pulmonary:      Effort: Pulmonary effort is normal. No respiratory distress.      Breath sounds: Normal breath sounds. No wheezing or rhonchi.   Abdominal:      General: Abdomen is flat.      Palpations: Abdomen is soft.      Tenderness: There is no abdominal tenderness. There is no guarding or rebound.   Musculoskeletal:         General: Normal range of motion.      Cervical back: Normal range of motion and neck supple.      Right lower leg: No edema.      Left lower leg: No edema.   Skin:     General: Skin is warm and dry.   Neurological:      Mental Status: She is alert and oriented to person, place, and time. Mental status is at baseline.   Psychiatric:         Mood and Affect: Mood normal.                    Medical Decision Making:      Comorbidities that affect care:    Hypertension, hyperlipidemia    External Notes reviewed:          The following orders were placed and all results were independently analyzed by me:  Orders Placed This Encounter   Procedures    XR Chest 1 View    [place] Draw    High Sensitivity Troponin T    Comprehensive Metabolic Panel    Lipase    BNP    Magnesium    CBC Auto Differential    High Sensitivity Troponin T 1Hr    Ambulatory Referral to Cardiology for Chest Pain    NPO Diet NPO Type: Strict NPO    Undress & Gown    Continuous Pulse Oximetry    Oxygen Therapy- Nasal Cannula; Titrate 1-6 LPM Per SpO2; 90 - 95%    ECG 12 Lead ED Triage Standing Order; Chest Pain    ECG 12 Lead ED Triage Standing Order; Chest Pain    Insert Peripheral IV    CBC & Differential    Green Top (Gel)    Lavender Top    Gold Top - SST    Light Blue Top       Medications Given in the Emergency Department:  Medications   sodium chloride 0.9 % flush 10 mL (has no administration in time range)        ED Course:         Labs:    Lab  Results (last 24 hours)       Procedure Component Value Units Date/Time    High Sensitivity Troponin T [[id number]]  (Normal) Collected: 06/09/25 1057    Specimen: Blood from Arm, Right Updated: 06/09/25 1226     HS Troponin T <6 ng/L     Narrative:      High Sensitive Troponin T Reference Range:  <14.0 ng/L- Negative Female for AMI  <22.0 ng/L- Negative Male for AMI  >=14 - Abnormal Female indicating possible myocardial injury.  >=22 - Abnormal Male indicating possible myocardial injury.   Clinicians would have to utilize clinical acumen, EKG, Troponin, and serial changes to determine if it is an Acute Myocardial Infarction or myocardial injury due to an underlying chronic condition.         CBC & Differential [[id number]]  (Normal) Collected: 06/09/25 1057    Specimen: Blood from Arm, Right Updated: 06/09/25 1159    Narrative:      The following orders were created for panel order CBC & Differential.  Procedure                               Abnormality         Status                     ---------                               -----------         ------                     CBC Auto Differential[[id number]]        Normal              Final result                 Please view results for these tests on the individual orders.    Comprehensive Metabolic Panel [[id number]]  (Abnormal) Collected: 06/09/25 1057    Specimen: Blood from Arm, Right Updated: 06/09/25 1226     Glucose 80 mg/dL      BUN 12.3 mg/dL      Creatinine 1.16 mg/dL      Sodium 139 mmol/L      Potassium 4.1 mmol/L      Chloride 108 mmol/L      CO2 20.1 mmol/L      Calcium 9.3 mg/dL      Total Protein 7.2 g/dL      Albumin 4.3 g/dL      ALT (SGPT) 12 U/L      AST (SGOT) 13 U/L      Alkaline Phosphatase 78 U/L      Total Bilirubin 0.4 mg/dL      Globulin 2.9 gm/dL      A/G Ratio 1.5 g/dL      BUN/Creatinine Ratio 10.6     Anion Gap 10.9 mmol/L      eGFR 51.1 mL/min/1.73     Narrative:      GFR Categories in Chronic Kidney Disease (CKD)              GFR  Category          GFR (mL/min/1.73)    Interpretation  G1                    90 or greater        Normal or high (1)  G2                    60-89                Mild decrease (1)  G3a                   45-59                Mild to moderate decrease  G3b                   30-44                Moderate to severe decrease  G4                    15-29                Severe decrease  G5                    14 or less           Kidney failure    (1)In the absence of evidence of kidney disease, neither GFR category G1 or G2 fulfill the criteria for CKD.    eGFR calculation 2021 CKD-EPI creatinine equation, which does not include race as a factor    Lipase [[id number]]  (Normal) Collected: 06/09/25 1057    Specimen: Blood from Arm, Right Updated: 06/09/25 1226     Lipase 55 U/L     BNP [[id number]]  (Normal) Collected: 06/09/25 1057    Specimen: Blood from Arm, Right Updated: 06/09/25 1220     proBNP 56.9 pg/mL     Narrative:      This assay is used as an aid in the diagnosis of individuals suspected of having heart failure. It can be used as an aid in the diagnosis of acute decompensated heart failure (ADHF) in patients presenting with signs and symptoms of ADHF to the emergency department (ED). In addition, NT-proBNP of <300 pg/mL indicates ADHF is not likely.    Age Range Result Interpretation  NT-proBNP Concentration (pg/mL:      <50             Positive            >450                   Gray                 300-450                    Negative             <300    50-75           Positive            >900                  Gray                300-900                  Negative            <300      >75             Positive            >1800                  Gray                300-1800                  Negative            <300    Magnesium [[id number]]  (Normal) Collected: 06/09/25 1057    Specimen: Blood from Arm, Right Updated: 06/09/25 1226     Magnesium 2.2 mg/dL     CBC Auto Differential [[id number]]  (Normal) Collected:  06/09/25 1057    Specimen: Blood from Arm, Right Updated: 06/09/25 1159     WBC 5.39 10*3/mm3      RBC 4.07 10*6/mm3      Hemoglobin 12.9 g/dL      Hematocrit 39.3 %      MCV 96.6 fL      MCH 31.7 pg      MCHC 32.8 g/dL      RDW 13.0 %      RDW-SD 46.2 fl      MPV 9.5 fL      Platelets 304 10*3/mm3      Neutrophil % 56.8 %      Lymphocyte % 31.7 %      Monocyte % 7.2 %      Eosinophil % 3.7 %      Basophil % 0.4 %      Immature Grans % 0.2 %      Neutrophils, Absolute 3.06 10*3/mm3      Lymphocytes, Absolute 1.71 10*3/mm3      Monocytes, Absolute 0.39 10*3/mm3      Eosinophils, Absolute 0.20 10*3/mm3      Basophils, Absolute 0.02 10*3/mm3      Immature Grans, Absolute 0.01 10*3/mm3      nRBC 0.0 /100 WBC     High Sensitivity Troponin T 1Hr [[id number]] Collected: 06/09/25 1300    Specimen: Blood Updated: 06/09/25 1320     HS Troponin T <6 ng/L      Troponin T Numeric Delta --     Comment: Unable to calculate.       Narrative:      High Sensitive Troponin T Reference Range:  <14.0 ng/L- Negative Female for AMI  <22.0 ng/L- Negative Male for AMI  >=14 - Abnormal Female indicating possible myocardial injury.  >=22 - Abnormal Male indicating possible myocardial injury.   Clinicians would have to utilize clinical acumen, EKG, Troponin, and serial changes to determine if it is an Acute Myocardial Infarction or myocardial injury due to an underlying chronic condition.                  Imaging:    XR Chest 1 View  Result Date: 6/9/2025  XR CHEST 1 VW Date of Exam: 6/9/2025 11:11 AM EDT Indication: Chest Pain Triage Protocol Comparison: Chest radiograph dated 5/25/2025 Findings: The cardiomediastinal silhouette is within normal limits. Pulmonary vascularity appears normal. There is no focal airspace consolidation, pleural effusion, or pneumothorax. There are mild degenerative changes of the thoracic spine.     Impression: No acute cardiopulmonary abnormality. Electronically Signed: Lon Elam MD  6/9/2025 11:30 AM EDT   Workstation ID: [id number]        Differential Diagnosis and Discussion:    Chest Pain:  Based on the patient's signs and symptoms, I considered aortic dissection, myocardial infaction, pulmonary embolism, cardiac tamponade, pericarditis, pneumothorax, musculoskeletal chest pain and other differential diagnosis as an etiology of the patient's chest pain.     PROCEDURES:    Labs were collected in the emergency department and all labs were reviewed and interpreted by me.  X-ray were performed in the emergency department and all X-ray impressions were independently interpreted by me.  An EKG was performed and the EKG was interpreted by supervising attending.    ECG 12 Lead ED Triage Standing Order; Chest Pain   Preliminary Result   HEART RATE=75  bpm   RR Przzedaq=253  ms   UT Zbkoawzo=407  ms   P Horizontal Axis=9  deg   P Front Axis=20  deg   QRSD Interval=85  ms   QT Afaigccb=109  ms   ULoP=551  ms   QRS Axis=12  deg   T Wave Axis=38  deg   - NORMAL ECG -   Sinus rhythm   Date and Time of Study:2025-06-09 10:52:11          Procedures    MDM       The patient´s CBC that was reviewed and interpreted by me shows no abnormalities of critical concern. Of note, there is no anemia requiring a blood transfusion and the platelet count is acceptable.  BNP and troponin x2 within normal limits.  Chest x-ray showed no acute abnormality.  Oxygen saturation 100% on room air.  Patient afebrile.  I offered patient admission given cardiac history but patient denied.  I will provide an ambulatory referral to cardiology for follow-up.  I instructed patient to return to ED if she develops any new or worsening symptoms.  Patient states she understands and agrees with plan of care.              Patient Care Considerations:          Consultants/Shared Management Plan:    None    Social Determinants of Health:    Patient is independent, reliable, and has access to care.       Disposition and Care Coordination:    Discharged: The patient is  suitable and stable for discharge with no need for consideration of admission.    I have explained the patient´s condition, diagnoses and treatment plan based on the information available to me at this time. I have answered questions and addressed any concerns. The patient has a good  understanding of the patient´s diagnosis, condition, and treatment plan as can be expected at this point. The vital signs have been stable. The patient´s condition is stable and appropriate for discharge from the emergency department.      The patient will pursue further outpatient evaluation with the primary care physician or other designated or consulting physician as outlined in the discharge instructions. They are agreeable to this plan of care and follow-up instructions have been explained in detail. The patient has received these instructions in written format and has expressed an understanding of the discharge instructions. The patient is aware that any significant change in condition or worsening of symptoms should prompt an immediate return to this or the closest emergency department or call to 911.  I have explained discharge medications and the need for follow up with the patient/caretakers. This was also printed in the discharge instructions. Patient was discharged with the following medications and follow up:      Medication List        Changed      SEMAGLUTIDE PO  What changed: how much to take           Scotty Khan MD  [address] [place] [id number]  [phone]    Call in 1 day  To schedule follow-up       Final diagnoses:   Chest pain, unspecified type        ED Disposition       ED Disposition   Discharge    Condition   Stable    Comment   --               This medical record created using voice recognition software.             Scotty Goddard PA-C  06/09/25 [id number]

## 2025-06-09 NOTE — ED PROVIDER NOTES
"SHARED VISIT ATTESTATION:    This visit was performed by myself and an APC.  I personally approved the management plan/medical decision making and take responsibility for the patient management.      SHARED VISIT NOTE:    Patient is 70 y.o. year old female that presents to the ED for evaluation of chest pain and pressure earlier today that has since resolved.  Previous history of MI with stent..     Physical Exam    ED Course:    /87 (BP Location: Left arm, Patient Position: Lying)   Pulse 73   Temp 97.6 °F (36.4 °C) (Oral)   Resp 18   Ht 162.6 cm (64\")   Wt 86.8 kg (191 lb 5.8 oz)   SpO2 96%   BMI 32.85 kg/m²       The following orders were placed and all results were independently analyzed by me:  Orders Placed This Encounter   Procedures    XR Chest 1 View    [place] Draw    High Sensitivity Troponin T    Comprehensive Metabolic Panel    Lipase    BNP    Magnesium    CBC Auto Differential    High Sensitivity Troponin T 1Hr    Ambulatory Referral to Cardiology for Chest Pain    NPO Diet NPO Type: Strict NPO    Undress & Gown    Continuous Pulse Oximetry    Oxygen Therapy- Nasal Cannula; Titrate 1-6 LPM Per SpO2; 90 - 95%    ECG 12 Lead ED Triage Standing Order; Chest Pain    ECG 12 Lead ED Triage Standing Order; Chest Pain    Insert Peripheral IV    CBC & Differential    Green Top (Gel)    Lavender Top    Gold Top - SST    Light Blue Top       Medications Given in the Emergency Department:  Medications   sodium chloride 0.9 % flush 10 mL (has no administration in time range)        ED Course:         Labs:    Lab Results (last 24 hours)       Procedure Component Value Units Date/Time    High Sensitivity Troponin T [[id number]]  (Normal) Collected: 06/09/25 1057    Specimen: Blood from Arm, Right Updated: 06/09/25 1226     HS Troponin T <6 ng/L     Narrative:      High Sensitive Troponin T Reference Range:  <14.0 ng/L- Negative Female for AMI  <22.0 ng/L- Negative Male for AMI  >=14 - Abnormal Female " indicating possible myocardial injury.  >=22 - Abnormal Male indicating possible myocardial injury.   Clinicians would have to utilize clinical acumen, EKG, Troponin, and serial changes to determine if it is an Acute Myocardial Infarction or myocardial injury due to an underlying chronic condition.         CBC & Differential [[id number]]  (Normal) Collected: 06/09/25 1057    Specimen: Blood from Arm, Right Updated: 06/09/25 1159    Narrative:      The following orders were created for panel order CBC & Differential.  Procedure                               Abnormality         Status                     ---------                               -----------         ------                     CBC Auto Differential[[id number]]        Normal              Final result                 Please view results for these tests on the individual orders.    Comprehensive Metabolic Panel [[id number]]  (Abnormal) Collected: 06/09/25 1057    Specimen: Blood from Arm, Right Updated: 06/09/25 1226     Glucose 80 mg/dL      BUN 12.3 mg/dL      Creatinine 1.16 mg/dL      Sodium 139 mmol/L      Potassium 4.1 mmol/L      Chloride 108 mmol/L      CO2 20.1 mmol/L      Calcium 9.3 mg/dL      Total Protein 7.2 g/dL      Albumin 4.3 g/dL      ALT (SGPT) 12 U/L      AST (SGOT) 13 U/L      Alkaline Phosphatase 78 U/L      Total Bilirubin 0.4 mg/dL      Globulin 2.9 gm/dL      A/G Ratio 1.5 g/dL      BUN/Creatinine Ratio 10.6     Anion Gap 10.9 mmol/L      eGFR 51.1 mL/min/1.73     Narrative:      GFR Categories in Chronic Kidney Disease (CKD)              GFR Category          GFR (mL/min/1.73)    Interpretation  G1                    90 or greater        Normal or high (1)  G2                    60-89                Mild decrease (1)  G3a                   45-59                Mild to moderate decrease  G3b                   30-44                Moderate to severe decrease  G4                    15-29                Severe decrease  G5                     14 or less           Kidney failure    (1)In the absence of evidence of kidney disease, neither GFR category G1 or G2 fulfill the criteria for CKD.    eGFR calculation 2021 CKD-EPI creatinine equation, which does not include race as a factor    Lipase [[id number]]  (Normal) Collected: 06/09/25 1057    Specimen: Blood from Arm, Right Updated: 06/09/25 1226     Lipase 55 U/L     BNP [[id number]]  (Normal) Collected: 06/09/25 1057    Specimen: Blood from Arm, Right Updated: 06/09/25 1220     proBNP 56.9 pg/mL     Narrative:      This assay is used as an aid in the diagnosis of individuals suspected of having heart failure. It can be used as an aid in the diagnosis of acute decompensated heart failure (ADHF) in patients presenting with signs and symptoms of ADHF to the emergency department (ED). In addition, NT-proBNP of <300 pg/mL indicates ADHF is not likely.    Age Range Result Interpretation  NT-proBNP Concentration (pg/mL:      <50             Positive            >450                   Gray                 300-450                    Negative             <300    50-75           Positive            >900                  Gray                300-900                  Negative            <300      >75             Positive            >1800                  Gray                300-1800                  Negative            <300    Magnesium [[id number]]  (Normal) Collected: 06/09/25 1057    Specimen: Blood from Arm, Right Updated: 06/09/25 1226     Magnesium 2.2 mg/dL     CBC Auto Differential [[id number]]  (Normal) Collected: 06/09/25 1057    Specimen: Blood from Arm, Right Updated: 06/09/25 1159     WBC 5.39 10*3/mm3      RBC 4.07 10*6/mm3      Hemoglobin 12.9 g/dL      Hematocrit 39.3 %      MCV 96.6 fL      MCH 31.7 pg      MCHC 32.8 g/dL      RDW 13.0 %      RDW-SD 46.2 fl      MPV 9.5 fL      Platelets 304 10*3/mm3      Neutrophil % 56.8 %      Lymphocyte % 31.7 %      Monocyte % 7.2 %      Eosinophil % 3.7 %       Basophil % 0.4 %      Immature Grans % 0.2 %      Neutrophils, Absolute 3.06 10*3/mm3      Lymphocytes, Absolute 1.71 10*3/mm3      Monocytes, Absolute 0.39 10*3/mm3      Eosinophils, Absolute 0.20 10*3/mm3      Basophils, Absolute 0.02 10*3/mm3      Immature Grans, Absolute 0.01 10*3/mm3      nRBC 0.0 /100 WBC     High Sensitivity Troponin T 1Hr [[id number]] Collected: 06/09/25 1300    Specimen: Blood Updated: 06/09/25 1320     HS Troponin T <6 ng/L      Troponin T Numeric Delta --     Comment: Unable to calculate.       Narrative:      High Sensitive Troponin T Reference Range:  <14.0 ng/L- Negative Female for AMI  <22.0 ng/L- Negative Male for AMI  >=14 - Abnormal Female indicating possible myocardial injury.  >=22 - Abnormal Male indicating possible myocardial injury.   Clinicians would have to utilize clinical acumen, EKG, Troponin, and serial changes to determine if it is an Acute Myocardial Infarction or myocardial injury due to an underlying chronic condition.                  Imaging:    XR Chest 1 View  Result Date: 6/9/2025  XR CHEST 1 VW Date of Exam: 6/9/2025 11:11 AM EDT Indication: Chest Pain Triage Protocol Comparison: Chest radiograph dated 5/25/2025 Findings: The cardiomediastinal silhouette is within normal limits. Pulmonary vascularity appears normal. There is no focal airspace consolidation, pleural effusion, or pneumothorax. There are mild degenerative changes of the thoracic spine.     Impression: No acute cardiopulmonary abnormality. Electronically Signed: Lon Elam MD  6/9/2025 11:30 AM EDT  Workstation ID: [id number]      MDM:    Procedures    Labs were collected in the emergency department and all labs were reviewed and interpreted by me.  X-ray were performed in the emergency department and all X-ray impressions were independently interpreted by me.  An EKG was performed and the EKG was interpreted by me.                     Omero Jacobsen MD  15:53 EDT  06/09/25         Delmar  MD Omero  06/09/25 5534

## 2025-06-16 ENCOUNTER — OFFICE VISIT (OUTPATIENT)
Dept: CARDIOLOGY | Facility: CLINIC | Age: 70
End: 2025-06-16
Payer: MEDICARE

## 2025-06-16 VITALS
HEIGHT: 64 IN | SYSTOLIC BLOOD PRESSURE: 146 MMHG | WEIGHT: 186.2 LBS | BODY MASS INDEX: 31.79 KG/M2 | DIASTOLIC BLOOD PRESSURE: 100 MMHG | HEART RATE: 73 BPM

## 2025-06-16 DIAGNOSIS — E78.2 MIXED DYSLIPIDEMIA: ICD-10-CM

## 2025-06-16 DIAGNOSIS — I10 ESSENTIAL HYPERTENSION: ICD-10-CM

## 2025-06-16 DIAGNOSIS — Z98.61 CAD S/P PERCUTANEOUS CORONARY ANGIOPLASTY: ICD-10-CM

## 2025-06-16 DIAGNOSIS — R07.9 CHEST PAIN, UNSPECIFIED TYPE: Primary | ICD-10-CM

## 2025-06-16 DIAGNOSIS — I25.10 CAD S/P PERCUTANEOUS CORONARY ANGIOPLASTY: ICD-10-CM

## 2025-06-16 PROCEDURE — 1160F RVW MEDS BY RX/DR IN RCRD: CPT

## 2025-06-16 PROCEDURE — 99214 OFFICE O/P EST MOD 30 MIN: CPT

## 2025-06-16 PROCEDURE — 1159F MED LIST DOCD IN RCRD: CPT

## 2025-06-16 RX ORDER — CLONIDINE HYDROCHLORIDE 0.2 MG/1
0.2 TABLET ORAL DAILY PRN
COMMUNITY

## 2025-06-16 RX ORDER — BIOTIN 1000 MCG
7.5 TABLET,CHEWABLE ORAL DAILY
COMMUNITY

## 2025-06-16 RX ORDER — SPIRONOLACTONE 50 MG/1
50 TABLET, FILM COATED ORAL DAILY
Qty: 60 TABLET | Refills: 3 | Status: SHIPPED | OUTPATIENT
Start: 2025-06-16

## 2025-06-16 RX ORDER — VONOPRAZAN FUMARATE 26.72 MG/1
20 TABLET ORAL DAILY
COMMUNITY

## 2025-06-16 NOTE — PROGRESS NOTES
Chief Complaint  Follow-up and Chest Pain    Subjective        History of Present Illness  Radha Bocanegra presents to [facility] for follow up.   Patient is a 70-year-old female with past medical history outlined below, significant for MI in 2020 status post PCI to the LAD using ANGY, obesity, hypertension, hyperlipidemia who presents for follow up on recent ER visit for chest pain.  She presented to the ER with midsternal chest discomfort that lasted about 2 hours.  She attributes it to acid reflux.  She has not had any recurrence of the symptoms.  She has no exertional chest discomfort.  ER workup was unremarkable with negative troponins, negative chest x-ray, normal EKG.  Patient does report that her blood pressure is trending on the high side at home and presents with a home blood pressure log that demonstrates the same.  She gets short of breath with moderate to heavy exertion which is normal for her and unchanged.  She has no palpitations or edema.    Past Medical History:   Diagnosis Date    Ankle sprain 1972    Clotting disorder     On blood thinner    Colon polyp     Found some last time    Dislocation of finger 2012    Fracture of wrist 1967    Fracture, femur 03/23/2023    GERD (gastroesophageal reflux disease)     Hyperlipidemia     Hypertension     Knee swelling     Myocardial infarction 6/17/20    Osteoporosis     Seasonal allergies     Tear of meniscus of knee 2012    Tennis elbow 1990       ALLERGY  Allergies   Allergen Reactions    Advil [Ibuprofen] Hives        Past Surgical History:   Procedure Laterality Date    CAROTID STENT  2020    COLONOSCOPY      You all done the colonoscopy    COLONOSCOPY N/A 12/21/2023    Procedure: COLONOSCOPY WITH POLYPECTOMY;  Surgeon: Sudheer Kenney MD;  Location: [facility] ENDOSCOPY;  Service: Gastroenterology;  Laterality: N/A;  COLON POLYP, DIVERTICULOSIS    CORONARY STENT PLACEMENT  2020    HAND SURGERY      HYSTERECTOMY N/A     KNEE  SURGERY  2012        Social History     Socioeconomic History    Marital status:    Tobacco Use    Smoking status: Former     Current packs/day: 0.00     Average packs/day: 1.1 packs/day for 23.6 years (26.5 ttl pk-yrs)     Types: Cigarettes     Start date: 1998     Quit date: 2016     Years since quittin.2     Passive exposure: Past    Smokeless tobacco: Never   Vaping Use    Vaping status: Never Used   Substance and Sexual Activity    Alcohol use: Yes     Alcohol/week: 1.0 standard drink of alcohol     Types: 1 Cans of beer per week     Comment: Once twice month    Drug use: Never    Sexual activity: Yes     Partners: Male     Birth control/protection: None, Hysterectomy       Family History   Problem Relation Age of Onset    Osteoporosis Mother     Cancer Father         Colon cancer    Colon cancer Father          from colon cancer    Heart attack Father     Anesthesia problems Sister         Current Outpatient Medications on File Prior to Visit   Medication Sig    albuterol sulfate  (90 Base) MCG/ACT inhaler Inhale 2 puffs Every 6 (Six) Hours As Needed for Wheezing.    aspirin 81 MG chewable tablet Chew 1 tablet Daily.    Biotin 1000 MCG chewable tablet Chew 7.5 mg Daily.    buPROPion SR (WELLBUTRIN SR) 100 MG 12 hr tablet     cloNIDine (CATAPRES) 0.2 MG tablet Take 1 tablet by mouth Daily As Needed for High Blood Pressure.    cyclobenzaprine (FLEXERIL) 10 MG tablet     Elderberry-Vitamin C-Zinc (ELDERBERRY EXTRACT PO) Take 500 mg by mouth Daily.    fexofenadine-pseudoephedrine (ALLEGRA-D 24) 180-240 MG per 24 hr tablet Take 1 tablet by mouth Daily.    fluticasone (FLONASE) 50 MCG/ACT nasal spray Administer 2 sprays into the nostril(s) as directed by provider Daily.    folic acid-vit B6-vit B12 (FOLGARD) 2.2-25-1 MG tablet tablet Take 1 tablet by mouth.    losartan (COZAAR) 25 MG tablet TAKE 1 TABLET BY MOUTH DAILY. (Patient taking differently: Take 4 tablets by mouth Daily.)     "metoprolol succinate XL (TOPROL-XL) 100 MG 24 hr tablet     rosuvastatin (CRESTOR) 20 MG tablet TAKE 1 TABLET BY MOUTH DAILY.    SEMAGLUTIDE PO Inject 2.5 mg under the skin into the appropriate area as directed 1 (One) Time Per Week. (Patient taking differently: Inject 1 mg under the skin into the appropriate area as directed 1 (One) Time Per Week.)    topiramate (TOPAMAX) 50 MG tablet     valACYclovir (VALTREX) 1000 MG tablet     Vitamin D, Cholecalciferol, (CHOLECALCIFEROL) 10 MCG (400 UNIT) tablet Take 1 tablet by mouth Daily.    Vonoprazan Fumarate (Voquezna) 20 MG tablet Take 1 tablet by mouth Daily.    [DISCONTINUED] spironolactone (ALDACTONE) 25 MG tablet     famotidine (PEPCID) 40 MG tablet Take 1 tablet by mouth Daily. (Patient not taking: Reported on 6/16/2025)    pantoprazole (PROTONIX) 20 MG EC tablet Take 1 tablet by mouth Daily. (Patient not taking: Reported on 6/16/2025)    [DISCONTINUED] hydroCHLOROthiazide (HYDRODIURIL) 25 MG tablet  (Patient not taking: Reported on 6/16/2025)     No current facility-administered medications on file prior to visit.       Objective   Vitals:    06/16/25 0853   BP: 146/100   BP Location: Left arm   Patient Position: Sitting   Cuff Size: Adult   Pulse: 73   Weight: 84.5 kg (186 lb 3.2 oz)   Height: 162.6 cm (64.02\")       Physical Exam  Constitutional:       General: She is awake. She is not in acute distress.     Appearance: Normal appearance.   HENT:      Head: Normocephalic.      Nose: Nose normal. No congestion.   Eyes:      Extraocular Movements: Extraocular movements intact.      Conjunctiva/sclera: Conjunctivae normal.      Pupils: Pupils are equal, round, and reactive to light.   Neck:      Thyroid: No thyromegaly.      Vascular: No JVD.   Cardiovascular:      Rate and Rhythm: Normal rate and regular rhythm.      Chest Wall: PMI is not displaced.      Pulses: Normal pulses.      Heart sounds: Normal heart sounds, S1 normal and S2 normal. No murmur heard.     No " friction rub. No gallop. No S3 or S4 sounds.   Pulmonary:      Effort: Pulmonary effort is normal.      Breath sounds: Normal breath sounds. No wheezing, rhonchi or rales.   Abdominal:      General: Bowel sounds are normal.      Palpations: Abdomen is soft.      Tenderness: There is no abdominal tenderness.   Musculoskeletal:      Cervical back: No tenderness.      Right lower leg: No edema.      Left lower leg: No edema.   Lymphadenopathy:      Cervical: No cervical adenopathy.   Skin:     General: Skin is warm and dry.      Capillary Refill: Capillary refill takes less than 2 seconds.      Coloration: Skin is not cyanotic.      Findings: No petechiae or rash.      Nails: There is no clubbing.   Neurological:      Mental Status: She is alert.   Psychiatric:         Mood and Affect: Mood normal.         Behavior: Behavior is cooperative.           Result Review     The following data was reviewed by WEI Reyes on 06/16/25.      CMP          4/25/2025    09:46 6/9/2025    10:57   CMP   Glucose 116  80    BUN 13  12.3    Creatinine 1.20  1.16    EGFR 49.1  51.1    Sodium 135  139    Potassium 3.3  4.1    Chloride 96  108    Calcium 8.7  9.3    Total Protein 8.0  7.2    Albumin 4.2  4.3    Globulin 3.8  2.9    Total Bilirubin 0.7  0.4    Alkaline Phosphatase 182  78    AST (SGOT) 38  13    ALT (SGPT) 33  12    Albumin/Globulin Ratio 1.1  1.5    BUN/Creatinine Ratio 10.8  10.6    Anion Gap 17.7  10.9      CBC w/diff          4/25/2025    09:46 6/9/2025    10:57   CBC w/Diff   WBC 11.31  5.39    RBC 3.99  4.07    Hemoglobin 13.0  12.9    Hematocrit 36.8  39.3    MCV 92.2  96.6    MCH 32.6  31.7    MCHC 35.3  32.8    RDW 12.2  13.0    Platelets 291  304    Neutrophil Rel % 81.7  56.8    Immature Granulocyte Rel % 0.3  0.2    Lymphocyte Rel % 8.6  31.7    Monocyte Rel % 9.0  7.2    Eosinophil Rel % 0.2  3.7    Basophil Rel % 0.2  0.4               No results found for this or any previous visit.           Procedures      Assessment & Plan  Chest pain, unspecified type  Patient had this lone episode of chest discomfort which was atypical.  She has not had any recurrence.  ER evaluation was unremarkable including normal EKG, negative troponins and normal chest x-ray.  No further workup is necessary in that regard.  CAD S/P percutaneous coronary angioplasty  Status post PCI in 2020.  Patient notes no angina or anginal-like symptoms.  Continue baby aspirin and metoprolol.  Essential hypertension  Patient's blood pressure is on the high side.  Her losartan was recently increased to 100 mg daily.  Continue the same.  Increase spironolactone to 50 mg daily.  Keep a home blood pressure log for 2 weeks and submit it for review.  Check a BMP in 2 weeks.  If blood pressure remains elevated would add amlodipine.  Mixed dyslipidemia  Continue statin therapy.                     Follow Up   Return for Next scheduled follow up, With Dr. Khan.    Patient was given instructions and counseling regarding her condition or for health maintenance advice. Please see specific information pulled into the AVS if appropriate.     Cindy Hawk, WEI  06/16/25  08:52 EDT    Dictated Utilizing Dragon Dictation

## 2025-06-17 LAB
QT INTERVAL: 384 MS
QTC INTERVAL: 430 MS

## 2025-06-24 DIAGNOSIS — E78.2 MIXED DYSLIPIDEMIA: ICD-10-CM

## 2025-06-30 RX ORDER — ROSUVASTATIN CALCIUM 20 MG/1
20 TABLET, COATED ORAL DAILY
Qty: 30 TABLET | Refills: 0 | Status: SHIPPED | OUTPATIENT
Start: 2025-06-30

## 2025-07-01 ENCOUNTER — LAB (OUTPATIENT)
Dept: LAB | Facility: HOSPITAL | Age: 70
End: 2025-07-01
Payer: MEDICARE

## 2025-07-01 DIAGNOSIS — I10 ESSENTIAL HYPERTENSION: ICD-10-CM

## 2025-07-01 LAB
ANION GAP SERPL CALCULATED.3IONS-SCNC: 11.2 MMOL/L (ref 5–15)
BUN SERPL-MCNC: 8 MG/DL (ref 8–23)
BUN/CREAT SERPL: 8.4 (ref 7–25)
CALCIUM SPEC-SCNC: 9.4 MG/DL (ref 8.6–10.5)
CHLORIDE SERPL-SCNC: 104 MMOL/L (ref 98–107)
CO2 SERPL-SCNC: 23.8 MMOL/L (ref 22–29)
CREAT SERPL-MCNC: 0.95 MG/DL (ref 0.57–1)
EGFRCR SERPLBLD CKD-EPI 2021: 65 ML/MIN/1.73
GLUCOSE SERPL-MCNC: 79 MG/DL (ref 65–99)
POTASSIUM SERPL-SCNC: 4.2 MMOL/L (ref 3.5–5.2)
SODIUM SERPL-SCNC: 139 MMOL/L (ref 136–145)

## 2025-07-01 PROCEDURE — 80048 BASIC METABOLIC PNL TOTAL CA: CPT

## 2025-07-01 PROCEDURE — 36415 COLL VENOUS BLD VENIPUNCTURE: CPT

## 2025-07-21 RX ORDER — ROSUVASTATIN CALCIUM 20 MG/1
20 TABLET, COATED ORAL DAILY
Qty: 90 TABLET | Refills: 1 | Status: SHIPPED | OUTPATIENT
Start: 2025-07-21

## (undated) DEVICE — SNAR POLYP CAPTIFLEX XS/OVL 11X2.4MM 240CM 1P/U

## (undated) DEVICE — SOLIDIFIER LIQLOC PLS 1500CC BT

## (undated) DEVICE — THE SINGLE USE ETRAP – POLYP TRAP IS USED FOR SUCTION RETRIEVAL OF ENDOSCOPICALLY REMOVED POLYPS.: Brand: ETRAP

## (undated) DEVICE — CONN JET HYDRA H20 AUXILIARY DISP

## (undated) DEVICE — Device

## (undated) DEVICE — LINER SURG CANSTR SXN S/RIGD 1500CC

## (undated) DEVICE — SOL IRRG H2O PL/BG 1000ML STRL

## (undated) DEVICE — Device: Brand: DEFENDO AIR/WATER/SUCTION AND BIOPSY VALVE